# Patient Record
Sex: FEMALE | Race: BLACK OR AFRICAN AMERICAN | NOT HISPANIC OR LATINO | Employment: FULL TIME | URBAN - METROPOLITAN AREA
[De-identification: names, ages, dates, MRNs, and addresses within clinical notes are randomized per-mention and may not be internally consistent; named-entity substitution may affect disease eponyms.]

---

## 2017-08-26 ENCOUNTER — OFFICE VISIT (OUTPATIENT)
Dept: OCCUPATIONAL MEDICINE | Facility: CLINIC | Age: 52
End: 2017-08-26
Payer: OTHER MISCELLANEOUS

## 2017-08-26 VITALS
HEART RATE: 73 BPM | OXYGEN SATURATION: 97 % | WEIGHT: 162 LBS | RESPIRATION RATE: 19 BRPM | TEMPERATURE: 99 F | DIASTOLIC BLOOD PRESSURE: 86 MMHG | BODY MASS INDEX: 31.8 KG/M2 | HEIGHT: 60 IN | SYSTOLIC BLOOD PRESSURE: 124 MMHG

## 2017-08-26 DIAGNOSIS — S49.91XA RIGHT SHOULDER INJURY, INITIAL ENCOUNTER: Primary | ICD-10-CM

## 2017-08-26 PROCEDURE — 3008F BODY MASS INDEX DOCD: CPT | Mod: S$GLB,,, | Performed by: PHYSICIAN ASSISTANT

## 2017-08-26 PROCEDURE — 99203 OFFICE O/P NEW LOW 30 MIN: CPT | Mod: S$GLB,,, | Performed by: PHYSICIAN ASSISTANT

## 2017-08-26 RX ORDER — HYDROCODONE BITARTRATE AND ACETAMINOPHEN 7.5; 325 MG/1; MG/1
1 TABLET ORAL EVERY 6 HOURS PRN
Qty: 20 TABLET | Refills: 0 | Status: SHIPPED | OUTPATIENT
Start: 2017-08-26 | End: 2017-08-31

## 2017-08-26 RX ORDER — CYCLOBENZAPRINE HCL 10 MG
10 TABLET ORAL 3 TIMES DAILY PRN
COMMUNITY
End: 2019-03-20

## 2017-08-26 RX ORDER — MELOXICAM 15 MG/1
15 TABLET ORAL DAILY
COMMUNITY
End: 2019-03-20

## 2017-08-26 NOTE — PATIENT INSTRUCTIONS
- Please return here or go to the Emergency Department for any concerns or worsening of condition.   - Apply ice packs to the affected area(s) for 20-30 minutes several times daily for swelling and pain in addition to rest, elevation, and compression (ex. Extremities). Allow 1 hour between icing sessions to avoid damage to skin.   * Large, cheap, and reusable ice pack(s) can be easily made as follows. INSTRUCTIONS: Mix 1 cup of rubbing (isopropyl) alcohol to 3 cups water into a 1 gallon zip lock bag. Squeeze remaining air out of the bag and seal. Bag(s) to be kept flat in a freezer until cold and after each use.   - If you were prescribed a narcotic medication, do not drive or operate heavy equipment or machinery while taking these medications.  - Please follow up with your primary care provider (PCP) or discussed specialist(s) as needed.         Shoulder Pain with Uncertain Cause  Shoulder pain can have many causes. Pain often comes from the structures that surround the shoulder joint. These are the joint capsule, ligaments, tendons, muscles, and bursa. Pain can also come from cartilage in the joint. Cartilage can become worn out or injured. Its important to know whats causing your pain so the healthcare provider can use the correct treatment. But sometimes its difficult to find the exact cause of shoulder pain. You may need to see a specialist (orthopedist). You may also need special tests such as a CT scan or MRI. The provider may need to use special tools to look inside the joint (arthroscopy).  Shoulder pain can be treated with a sling or a device that keeps your shoulder from moving. You can take an anti-inflammatory medicine such as ibuprofen to ease pain. You may need to do special shoulder exercises. Follow up with a specialist if the pain is severe or doesnt go away after a few weeks.  Home care  Follow these tips when caring for yourself at home:  · If a sling was given to you, leave it in place for  the time advised by your healthcare provider. If you arent sure how long to wear it, ask for advice. If the sling becomes loose, adjust it so that your forearm is level with the ground. Your shoulder should feel well supported.  · Put an ice pack on the injured area for 20 minutes every 1 to 2 hours the first day. You can make your own ice pack by putting ice cubes in a plastic bag. Wrap the bag in a thin towel. Continue with ice packs 3 to 4 times a day for the next 2 days. Then use the pack as needed to ease pain and swelling.  · You may use acetaminophen or ibuprofen to control pain, unless another pain medicine was prescribed. If you have chronic liver or kidney disease, talk with your healthcare provider before using these medicines. Also talk with your provider if youve ever had a stomach ulcer or GI bleeding.  · Shoulder pain may seem worse at night, when there is less to distract you from the pain. If you sleep on your side, try to keep weight off your painful shoulder. Propping pillows behind you may stop you from rolling over onto that shoulder during sleep.   · Shoulder and elbow joints can become stiff if left in a sling for too long. You should start range of motion exercises about 7 to 10 days after the injury. Talk with your provider to find out what type of exercises to do and how soon to start.  · You can take the sling off to shower or bathe.  Follow-up care  Follow up with your healthcare provider if you dont start to get better in the next 5 days.  When to seek medical advice  Call your healthcare provider right away if any of these occur:  · Pain or swelling gets worse or continues for more than a few days  · Your hand or fingers become cold, blue, numb, or tingly  · Large amount of bruising on your shoulder or upper arm  · Difficulty moving your hand or fingers  · Weakness in your hand or fingers  · Your shoulder becomes stiff  · It feels like your shoulder is popping out  · You are less  able to do your daily activities  Date Last Reviewed: 10/1/2016  © 7776-8649 [employer]. [address] [place] [address], [place] [address]. All rights reserved. This information is not intended as a substitute for professional medical care. Always follow your healthcare professional's instructions.

## 2017-08-26 NOTE — PROGRESS NOTES
Subjective:       Patient ID: Sarah Mohamud is a 52 y.o. female.    Chief Complaint: Shoulder Injury    Pt reports that she was injured at work on 7/18/2017. Pt reports injury to the right shoulder. Pt reports that's he was pulling on the door when there was resistance from an inmate. Pt reports that she has been taking meloxicam and flexeril to help with muscle spasms but she has not experienced any relief.      Shoulder Injury    The incident occurred at work. The right shoulder is affected. The incident occurred more than 1 week ago. The quality of the pain is described as aching and stabbing. The pain radiates to the right arm. The pain is at a severity of 7/10. The pain is severe. Associated symptoms include muscle weakness, numbness and tingling. Pertinent negatives include no chest pain. The symptoms are aggravated by movement, overhead lifting and palpation. She has tried ice, heat and NSAIDs for the symptoms. The treatment provided no relief.     Review of Systems   Cardiovascular: Negative for chest pain.   Musculoskeletal: Positive for joint swelling (Joint pain). Negative for back pain, gait problem, neck pain and neck stiffness.   Neurological: Positive for tingling and numbness.       Objective:      Physical Exam   Constitutional: She is oriented to person, place, and time. She appears well-developed and well-nourished.  Non-toxic appearance. She does not have a sickly appearance. She does not appear ill. She appears distressed.   HENT:   Head: Normocephalic and atraumatic. Head is without abrasion, without contusion and without laceration.   Right Ear: External ear normal.   Left Ear: External ear normal.   Nose: Nose normal.   Mouth/Throat: Oropharynx is clear and moist.   Eyes: Conjunctivae, EOM and lids are normal. Pupils are equal, round, and reactive to light.   Neck: Trachea normal, full passive range of motion without pain and phonation normal. Neck supple.   Cardiovascular: Normal rate,  regular rhythm and normal heart sounds.    Pulmonary/Chest: Effort normal and breath sounds normal. No stridor. No respiratory distress.   Musculoskeletal:        Right shoulder: She exhibits decreased range of motion (in all directions), tenderness (posterior greater than anterior), swelling, pain (with movement in all directions) and decreased strength. She exhibits no bony tenderness, no crepitus, no deformity, no laceration and normal pulse.        Right elbow: Normal.       Right wrist: Normal.        Cervical back: Normal.        Thoracic back: Normal.        Right hand: Normal.   Neurological: She is alert and oriented to person, place, and time.   Skin: Skin is warm, dry and intact. Capillary refill takes less than 2 seconds. No abrasion, no bruising, no burn, no ecchymosis, no laceration, no lesion and no rash noted. No erythema.   Psychiatric: She has a normal mood and affect. Her speech is normal and behavior is normal. Judgment and thought content normal. Cognition and memory are normal.   Nursing note and vitals reviewed.      /86   Pulse 73   Temp 98.9 °F (37.2 °C)   Resp 19   Ht 5' (1.524 m)   Wt 73.5 kg (162 lb)   SpO2 97%   BMI 31.64 kg/m²      Assessment:       1. Right shoulder injury, initial encounter        Plan:       Right shoulder injury, initial encounter  -     ORTHOPEDIC BRACING FOR HOME USE - UPPER EXTREMITY  -     hydrocodone-acetaminophen 7.5-325mg (NORCO) 7.5-325 mg per tablet; Take 1 tablet by mouth every 6 (six) hours as needed for Pain.  Dispense: 20 tablet; Refill: 0        - Not fit for duty  - Follow up AS[facility] with [facility] for further evaluation and management  - Likely a Rotator Cuff injury     - Please return here or go to the Emergency Department for any concerns or worsening of condition.   - Apply ice packs to the affected area(s) for 20-30 minutes several times daily for swelling and pain in addition to rest, elevation, and compression (ex. Extremities). Allow 1  hour between icing sessions to avoid damage to skin.   * Large, cheap, and reusable ice pack(s) can be easily made as follows. INSTRUCTIONS: Mix 1 cup of rubbing (isopropyl) alcohol to 3 cups water into a 1 gallon zip lock bag. Squeeze remaining air out of the bag and seal. Bag(s) to be kept flat in a freezer until cold and after each use.   - If you were prescribed a narcotic medication, do not drive or operate heavy equipment or machinery while taking these medications.

## 2017-08-28 ENCOUNTER — OFFICE VISIT (OUTPATIENT)
Dept: URGENT CARE | Facility: CLINIC | Age: 52
End: 2017-08-28
Payer: OTHER MISCELLANEOUS

## 2017-08-28 VITALS
SYSTOLIC BLOOD PRESSURE: 140 MMHG | BODY MASS INDEX: 28.7 KG/M2 | DIASTOLIC BLOOD PRESSURE: 92 MMHG | WEIGHT: 162 LBS | HEART RATE: 67 BPM | HEIGHT: 63 IN

## 2017-08-28 DIAGNOSIS — S43.401A SPRAIN OF RIGHT SHOULDER, UNSPECIFIED SHOULDER SPRAIN TYPE, INITIAL ENCOUNTER: Primary | ICD-10-CM

## 2017-08-28 PROCEDURE — 99203 OFFICE O/P NEW LOW 30 MIN: CPT | Mod: S$GLB,,, | Performed by: PREVENTIVE MEDICINE

## 2017-08-28 PROCEDURE — 3008F BODY MASS INDEX DOCD: CPT | Mod: S$GLB,,, | Performed by: PREVENTIVE MEDICINE

## 2017-08-28 RX ORDER — MELOXICAM 7.5 MG/1
TABLET ORAL
COMMUNITY
Start: 2017-08-11 | End: 2019-03-20

## 2017-08-28 NOTE — PROGRESS NOTES
Subjective:       Patient ID: Sarah Mohamud is a 52 y.o. female.    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 Pt c/o right shoulder pain. Was originally seen at the hospital in [facility] after the injury but states the pain has not gotten better.Seen in Urgent care on 08/26/17 and sent to [facility]. Has been taking Mobic and Flexeril for her symptoms with mild relief. Has not filled the Norco yet.       Shoulder Pain    The pain is present in the right shoulder. The current episode started more than 1 month ago. There has been a history of trauma. The problem occurs constantly. The problem has been unchanged. The quality of the pain is described as sharp and aching. The pain is at a severity of 8/10. The pain is moderate. Associated symptoms include a limited range of motion, numbness (with tingling, intermittent to the right thumb) and stiffness. Pertinent negatives include no fever, headaches or itching. The symptoms are aggravated by activity. She has tried NSAIDS (flexeril) for the symptoms. The treatment provided mild relief. There is no history of gout.     Review of Systems   Constitution: Negative for chills, fever and weakness.   HENT: Negative for congestion, ear pain, headaches and nosebleeds.    Eyes: Negative for blurred vision and pain.   Cardiovascular: Negative for chest pain and palpitations.   Respiratory: Negative for cough, shortness of breath and wheezing.    Skin: Negative for dry skin, itching and rash.   Musculoskeletal: Positive for joint pain and stiffness. Negative for arthritis, back pain, gout, joint swelling, muscle weakness and neck pain.   Gastrointestinal: Negative for abdominal pain, constipation, diarrhea, nausea and vomiting.   Genitourinary: Negative for dysuria, frequency and hematuria.   Neurological:  Positive for numbness (with tingling, intermittent to the right thumb). Negative for dizziness and seizures.   Allergic/Immunologic: Negative for hives.   All other systems reviewed and are negative.      Objective:      Physical Exam   Constitutional: She is oriented to person, place, and time. She appears well-developed and well-nourished.   HENT:   Head: Normocephalic and atraumatic.   Neck:       Cardiovascular: Normal rate.    Pulmonary/Chest: Effort normal.   Musculoskeletal: She exhibits tenderness.        Right shoulder: She exhibits decreased range of motion, tenderness, bony tenderness, pain and spasm. She exhibits no swelling and no crepitus.        Arms:  Neurological: She is alert and oriented to person, place, and time. She has normal reflexes.   Skin: Skin is warm and dry. Capillary refill takes less than 2 seconds.   Psychiatric: She has a normal mood and affect.       Had detailed discussion with the pt on starting Pt, home exercises, proper use of medications for her pain. Pt advised not to wear the shoulder sling anymore.   Assessment:       1. Sprain of right shoulder, unspecified shoulder sprain type, initial encounter        Plan:            Patient Instructions: Daily home exercises/warm soaks, Apply ice 24-48 hours then apply heat/warm soaks, PT to be scheduled once authorized (continue Mobic and Flexeril as directed)   Restrictions: No lifting/pushing/pulling more than 10 lbs, No above the shoulder/overhead work

## 2017-08-28 NOTE — LETTER
[facility]  [address], [address]  [place] [address]  Phone: [phone]  Fax: [phone]    Pt Name: Sarah Mohamud  Injury Date: 7/14/2017   Employee ID:  Date of First Treatment: 08/28/2017   Company: [employer]            Appointment Time: 12:45 PM Arrived:  1:00 PM CDT   Physician: Eran Silva MD Check out: 3:59 PM           Office Treatment: Sarah was seen today for shoulder pain.    Diagnoses and all orders for this visit:    Sprain of right shoulder, unspecified shoulder sprain type, initial encounter  -     X-Ray Shoulder 1 View Right; Future  -     X-Ray Scapula Right; Future       Patient Instructions: Daily home exercises/warm soaks, Apply ice 24-48 hours then apply heat/warm soaks, PT to be scheduled once authorized (continue Mobic and Flexeril as directed)    WORK STATUS: LIGHT DUTY   No lifting/pushing/pulling more than 10 lbs,   No above the shoulder/overhead work       Return Appointment: 9/1/2017@1:30pm

## 2019-03-20 ENCOUNTER — HOSPITAL ENCOUNTER (INPATIENT)
Facility: HOSPITAL | Age: 54
LOS: 4 days | Discharge: HOME | DRG: 638 | End: 2019-03-24
Attending: EMERGENCY MEDICINE | Admitting: HOSPITALIST
Payer: COMMERCIAL

## 2019-03-20 DIAGNOSIS — E11.10 DIABETIC KETOACIDOSIS: ICD-10-CM

## 2019-03-20 DIAGNOSIS — E86.0 DEHYDRATION: ICD-10-CM

## 2019-03-20 DIAGNOSIS — E87.8 HYPERCHLOREMIA: ICD-10-CM

## 2019-03-20 DIAGNOSIS — E87.0 HYPERNATREMIA: Primary | ICD-10-CM

## 2019-03-20 PROBLEM — E87.20 METABOLIC ACIDOSIS: Status: ACTIVE | Noted: 2019-03-20

## 2019-03-20 PROBLEM — E87.5 HYPERKALEMIA: Status: ACTIVE | Noted: 2019-03-20

## 2019-03-20 PROBLEM — N17.9 ARF (ACUTE RENAL FAILURE): Status: ACTIVE | Noted: 2019-03-20

## 2019-03-20 PROBLEM — E11.9 DIABETES MELLITUS, TYPE 2: Chronic | Status: ACTIVE | Noted: 2019-03-20

## 2019-03-20 LAB
ALBUMIN SERPL BCP-MCNC: 3.9 G/DL
ALLENS TEST: ABNORMAL
ALP SERPL-CCNC: 79 U/L
ALT SERPL W/O P-5'-P-CCNC: 17 U/L
ANION GAP SERPL CALC-SCNC: 25 MMOL/L
ANION GAP SERPL CALC-SCNC: 33 MMOL/L
ANION GAP SERPL CALC-SCNC: ABNORMAL MMOL/L
AST SERPL-CCNC: 16 U/L
BACTERIA #/AREA URNS HPF: ABNORMAL /HPF
BASOPHILS # BLD AUTO: 0.01 K/UL
BASOPHILS NFR BLD: 0.1 %
BILIRUB SERPL-MCNC: 0.5 MG/DL
BILIRUB UR QL STRIP: NEGATIVE
BILIRUB UR QL STRIP: NEGATIVE
BUN SERPL-MCNC: 50 MG/DL
BUN SERPL-MCNC: 57 MG/DL
BUN SERPL-MCNC: 78 MG/DL (ref 6–30)
CALCIUM SERPL-MCNC: 9.1 MG/DL
CALCIUM SERPL-MCNC: 9.9 MG/DL
CHLORIDE SERPL-SCNC: 117 MMOL/L
CHLORIDE SERPL-SCNC: 127 MMOL/L (ref 95–110)
CHLORIDE SERPL-SCNC: 129 MMOL/L
CLARITY UR: CLEAR
CLARITY UR: CLEAR
CO2 SERPL-SCNC: 11 MMOL/L
CO2 SERPL-SCNC: 13 MMOL/L
COLOR UR: YELLOW
COLOR UR: YELLOW
CREAT SERPL-MCNC: 1.5 MG/DL (ref 0.5–1.4)
CREAT SERPL-MCNC: 1.9 MG/DL
CREAT SERPL-MCNC: 2.5 MG/DL
DELSYS: ABNORMAL
DIFFERENTIAL METHOD: ABNORMAL
EOSINOPHIL # BLD AUTO: 0 K/UL
EOSINOPHIL NFR BLD: 0 %
ERYTHROCYTE [DISTWIDTH] IN BLOOD BY AUTOMATED COUNT: 14 %
EST. GFR  (AFRICAN AMERICAN): 25 ML/MIN/1.73 M^2
EST. GFR  (AFRICAN AMERICAN): 34 ML/MIN/1.73 M^2
EST. GFR  (NON AFRICAN AMERICAN): 21 ML/MIN/1.73 M^2
EST. GFR  (NON AFRICAN AMERICAN): 30 ML/MIN/1.73 M^2
GLUCOSE SERPL-MCNC: 432 MG/DL
GLUCOSE SERPL-MCNC: 620 MG/DL (ref 70–110)
GLUCOSE SERPL-MCNC: 677 MG/DL
GLUCOSE UR QL STRIP: ABNORMAL
GLUCOSE UR QL STRIP: ABNORMAL
HCO3 UR-SCNC: 14.1 MMOL/L (ref 24–28)
HCT VFR BLD AUTO: 52.2 %
HCT VFR BLD CALC: 47 %PCV (ref 36–54)
HGB BLD-MCNC: 16.6 G/DL
HGB UR QL STRIP: ABNORMAL
HGB UR QL STRIP: ABNORMAL
KETONES UR QL STRIP: ABNORMAL
KETONES UR QL STRIP: ABNORMAL
LEUKOCYTE ESTERASE UR QL STRIP: ABNORMAL
LEUKOCYTE ESTERASE UR QL STRIP: ABNORMAL
LYMPHOCYTES # BLD AUTO: 0.9 K/UL
LYMPHOCYTES NFR BLD: 9.3 %
MAGNESIUM SERPL-MCNC: 3.4 MG/DL
MCH RBC QN AUTO: 28.5 PG
MCHC RBC AUTO-ENTMCNC: 31.8 G/DL
MCV RBC AUTO: 90 FL
MICROSCOPIC COMMENT: ABNORMAL
MODE: ABNORMAL
MONOCYTES # BLD AUTO: 0.6 K/UL
MONOCYTES NFR BLD: 6.3 %
NEUTROPHILS # BLD AUTO: 7.9 K/UL
NEUTROPHILS NFR BLD: 84.3 %
NITRITE UR QL STRIP: NEGATIVE
NITRITE UR QL STRIP: NEGATIVE
PCO2 BLDA: 38.3 MMHG (ref 35–45)
PH SMN: 7.17 [PH] (ref 7.35–7.45)
PH UR STRIP: 5 [PH] (ref 5–8)
PH UR STRIP: 5 [PH] (ref 5–8)
PLATELET # BLD AUTO: 185 K/UL
PMV BLD AUTO: 12.8 FL
PO2 BLDA: 29 MMHG (ref 40–60)
POC BE: -14 MMOL/L
POC IONIZED CALCIUM: 1.02 MMOL/L (ref 1.06–1.42)
POC SATURATED O2: 40 % (ref 95–100)
POC TCO2 (MEASURED): 17 MMOL/L (ref 23–29)
POC TCO2: 15 MMOL/L (ref 24–29)
POCT GLUCOSE: 272 MG/DL (ref 70–110)
POCT GLUCOSE: 279 MG/DL (ref 70–110)
POCT GLUCOSE: 306 MG/DL (ref 70–110)
POCT GLUCOSE: 367 MG/DL (ref 70–110)
POCT GLUCOSE: 427 MG/DL (ref 70–110)
POCT GLUCOSE: 491 MG/DL (ref 70–110)
POTASSIUM BLD-SCNC: >9 MMOL/L (ref 3.5–5.1)
POTASSIUM SERPL-SCNC: 4.6 MMOL/L
POTASSIUM SERPL-SCNC: 5.6 MMOL/L
PROT SERPL-MCNC: 8.6 G/DL
PROT UR QL STRIP: NEGATIVE
PROT UR QL STRIP: NEGATIVE
RBC # BLD AUTO: 5.83 M/UL
RBC #/AREA URNS HPF: 5 /HPF (ref 0–4)
SAMPLE: ABNORMAL
SAMPLE: ABNORMAL
SITE: ABNORMAL
SODIUM BLD-SCNC: 155 MMOL/L (ref 136–145)
SODIUM SERPL-SCNC: 161 MMOL/L
SODIUM SERPL-SCNC: 167 MMOL/L
SP GR UR STRIP: 1.01 (ref 1–1.03)
SP GR UR STRIP: 1.01 (ref 1–1.03)
SP02: 96
SQUAMOUS #/AREA URNS HPF: 5 /HPF
TRICHOMONAS UR QL MICRO: ABNORMAL
URN SPEC COLLECT METH UR: ABNORMAL
URN SPEC COLLECT METH UR: ABNORMAL
UROBILINOGEN UR STRIP-ACNC: NEGATIVE EU/DL
UROBILINOGEN UR STRIP-ACNC: NEGATIVE EU/DL
WBC # BLD AUTO: 9.36 K/UL
WBC #/AREA URNS HPF: 40 /HPF (ref 0–5)
YEAST URNS QL MICRO: ABNORMAL

## 2019-03-20 PROCEDURE — 99900035 HC TECH TIME PER 15 MIN (STAT)

## 2019-03-20 PROCEDURE — 82803 BLOOD GASES ANY COMBINATION: CPT

## 2019-03-20 PROCEDURE — 36415 COLL VENOUS BLD VENIPUNCTURE: CPT

## 2019-03-20 PROCEDURE — 96361 HYDRATE IV INFUSION ADD-ON: CPT

## 2019-03-20 PROCEDURE — 93010 ELECTROCARDIOGRAM REPORT: CPT | Mod: ,,, | Performed by: INTERNAL MEDICINE

## 2019-03-20 PROCEDURE — 25000003 PHARM REV CODE 250: Performed by: HOSPITALIST

## 2019-03-20 PROCEDURE — 25000003 PHARM REV CODE 250: Performed by: EMERGENCY MEDICINE

## 2019-03-20 PROCEDURE — 83735 ASSAY OF MAGNESIUM: CPT

## 2019-03-20 PROCEDURE — 93010 EKG 12-LEAD: ICD-10-PCS | Mod: ,,, | Performed by: INTERNAL MEDICINE

## 2019-03-20 PROCEDURE — 82962 GLUCOSE BLOOD TEST: CPT

## 2019-03-20 PROCEDURE — 81000 URINALYSIS NONAUTO W/SCOPE: CPT

## 2019-03-20 PROCEDURE — 85025 COMPLETE CBC W/AUTO DIFF WBC: CPT

## 2019-03-20 PROCEDURE — 99285 EMERGENCY DEPT VISIT HI MDM: CPT

## 2019-03-20 PROCEDURE — 63600175 PHARM REV CODE 636 W HCPCS: Performed by: HOSPITALIST

## 2019-03-20 PROCEDURE — 87086 URINE CULTURE/COLONY COUNT: CPT

## 2019-03-20 PROCEDURE — 80048 BASIC METABOLIC PNL TOTAL CA: CPT

## 2019-03-20 PROCEDURE — 83036 HEMOGLOBIN GLYCOSYLATED A1C: CPT

## 2019-03-20 PROCEDURE — 80053 COMPREHEN METABOLIC PANEL: CPT

## 2019-03-20 PROCEDURE — 20000000 HC ICU ROOM

## 2019-03-20 PROCEDURE — 96374 THER/PROPH/DIAG INJ IV PUSH: CPT

## 2019-03-20 PROCEDURE — 96375 TX/PRO/DX INJ NEW DRUG ADDON: CPT

## 2019-03-20 PROCEDURE — 25000003 PHARM REV CODE 250: Performed by: INTERNAL MEDICINE

## 2019-03-20 PROCEDURE — 63600175 PHARM REV CODE 636 W HCPCS: Performed by: EMERGENCY MEDICINE

## 2019-03-20 PROCEDURE — 93005 ELECTROCARDIOGRAM TRACING: CPT

## 2019-03-20 RX ORDER — PROMETHAZINE HYDROCHLORIDE 25 MG/1
25 TABLET ORAL EVERY 6 HOURS PRN
Status: DISCONTINUED | OUTPATIENT
Start: 2019-03-20 | End: 2019-03-20

## 2019-03-20 RX ORDER — SODIUM CHLORIDE 450 MG/100ML
INJECTION, SOLUTION INTRAVENOUS CONTINUOUS
Status: DISCONTINUED | OUTPATIENT
Start: 2019-03-20 | End: 2019-03-21

## 2019-03-20 RX ORDER — METFORMIN HYDROCHLORIDE 500 MG/1
500 TABLET ORAL 2 TIMES DAILY WITH MEALS
COMMUNITY
End: 2019-03-20 | Stop reason: ALTCHOICE

## 2019-03-20 RX ORDER — ONDANSETRON 2 MG/ML
8 INJECTION INTRAMUSCULAR; INTRAVENOUS EVERY 6 HOURS PRN
Status: DISCONTINUED | OUTPATIENT
Start: 2019-03-20 | End: 2019-03-24 | Stop reason: HOSPADM

## 2019-03-20 RX ORDER — DEXTROSE MONOHYDRATE 100 MG/ML
1000 INJECTION, SOLUTION INTRAVENOUS
Status: DISCONTINUED | OUTPATIENT
Start: 2019-03-20 | End: 2019-03-20

## 2019-03-20 RX ORDER — POLYETHYLENE GLYCOL 3350 17 G/17G
17 POWDER, FOR SOLUTION ORAL DAILY
Status: DISCONTINUED | OUTPATIENT
Start: 2019-03-21 | End: 2019-03-20

## 2019-03-20 RX ORDER — SODIUM CHLORIDE 9 MG/ML
INJECTION, SOLUTION INTRAVENOUS CONTINUOUS
Status: DISCONTINUED | OUTPATIENT
Start: 2019-03-20 | End: 2019-03-21

## 2019-03-20 RX ORDER — SODIUM CHLORIDE 0.9 % (FLUSH) 0.9 %
5 SYRINGE (ML) INJECTION
Status: DISCONTINUED | OUTPATIENT
Start: 2019-03-20 | End: 2019-03-24 | Stop reason: HOSPADM

## 2019-03-20 RX ORDER — DEXTROSE MONOHYDRATE 100 MG/ML
1000 INJECTION, SOLUTION INTRAVENOUS
Status: DISCONTINUED | OUTPATIENT
Start: 2019-03-20 | End: 2019-03-21

## 2019-03-20 RX ORDER — ACETAMINOPHEN 325 MG/1
650 TABLET ORAL EVERY 4 HOURS PRN
Status: DISCONTINUED | OUTPATIENT
Start: 2019-03-20 | End: 2019-03-24 | Stop reason: HOSPADM

## 2019-03-20 RX ORDER — POLYETHYLENE GLYCOL 3350 17 G/17G
17 POWDER, FOR SOLUTION ORAL 2 TIMES DAILY PRN
Status: DISCONTINUED | OUTPATIENT
Start: 2019-03-20 | End: 2019-03-24 | Stop reason: HOSPADM

## 2019-03-20 RX ORDER — ONDANSETRON HYDROCHLORIDE 8 MG/1
8 TABLET, FILM COATED ORAL
COMMUNITY

## 2019-03-20 RX ORDER — HYDROCODONE BITARTRATE AND ACETAMINOPHEN 5; 325 MG/1; MG/1
1 TABLET ORAL EVERY 4 HOURS PRN
Status: DISCONTINUED | OUTPATIENT
Start: 2019-03-20 | End: 2019-03-20

## 2019-03-20 RX ORDER — HYDROCODONE BITARTRATE AND ACETAMINOPHEN 5; 325 MG/1; MG/1
1 TABLET ORAL EVERY 4 HOURS PRN
Status: DISCONTINUED | OUTPATIENT
Start: 2019-03-20 | End: 2019-03-23

## 2019-03-20 RX ADMIN — SODIUM CHLORIDE 4 UNITS/HR: 9 INJECTION, SOLUTION INTRAVENOUS at 06:03

## 2019-03-20 RX ADMIN — SODIUM CHLORIDE: 0.45 INJECTION, SOLUTION INTRAVENOUS at 09:03

## 2019-03-20 RX ADMIN — SODIUM CHLORIDE 4 UNITS/HR: 9 INJECTION, SOLUTION INTRAVENOUS at 05:03

## 2019-03-20 RX ADMIN — SODIUM CHLORIDE 250 ML: 0.9 INJECTION, SOLUTION INTRAVENOUS at 06:03

## 2019-03-20 RX ADMIN — INSULIN HUMAN 10 UNITS: 100 INJECTION, SOLUTION PARENTERAL at 04:03

## 2019-03-20 RX ADMIN — SODIUM CHLORIDE: 0.9 INJECTION, SOLUTION INTRAVENOUS at 06:03

## 2019-03-20 RX ADMIN — SODIUM CHLORIDE 2000 ML: 0.9 INJECTION, SOLUTION INTRAVENOUS at 04:03

## 2019-03-20 NOTE — HPI
55 y/o female presents complaining of generalized weakness.  She has recently been feeling bad for the last month.  Patient has been complaining of polydipsia, polyuria and generalized fatigue.  She was evaluated at an Urgent Care 2 days ago and diagnosed with DM.  No previous hx of DM.  Patient was started on Metformin.  Her symptoms have persisted over past 2 days.  Denies any fever or chills.  She had not seen a doctor in many years until being evaluated at Urgent Care.  She presented to ER where she was again noted to be severely hyperglycemic.  Labs also consistent with DKA.  No other complaints.

## 2019-03-20 NOTE — PROGRESS NOTES
Pt admitted to ICU for DKA. Pt with new onset of diabetes. Social work and dietician consulted. Insulin gtt at 4 units/hr and IVF's at 125 mL/hr. q6 BMP's. Remains free from fall, injury, or breakdown throughout shift.

## 2019-03-20 NOTE — ASSESSMENT & PLAN NOTE
Newly diagnosed DM presents with hyperglycemia and high anion gap metabolic acidosis, consistent with DKA.  Placed on insulin drip and admitted to ICU.  Start aggressive IVF hydration.  Monitor BMP every 4 hours.

## 2019-03-20 NOTE — ASSESSMENT & PLAN NOTE
Probably pre renal secondary to dehydration.  Should improve with IVF's.  Avoid nephrotoxic medications.

## 2019-03-20 NOTE — ED TRIAGE NOTES
Pt arrived via EMS from home. CC of hyperglycemia. Pt reports that she was dx with DMtype2 on Monday, was sent home with metformin and told to follow up with PCP. Pt reports she does not have a PCP so has been unable to follow up. Pt reports today with increased weakness and dehydrated. Pt denies N/V/F/D/SOB

## 2019-03-20 NOTE — H&P
[facility] Hospital Medicine  History & Physical    Patient Name: Sarah Mohamud  MRN: [id number]  Admission Date: 3/20/2019  Attending Physician: Jamin Solares MD   Primary Care Provider: ADRIANO Beaulieu         Patient information was obtained from patient and ER records.     Subjective:     Principal Problem:Diabetic ketoacidosis    Chief Complaint:   Chief Complaint   Patient presents with    Hyperglycemia     EMS reports just dx with diabetes on Monday, blood glucose >600, c/o weakness         HPI: 53 y/o female presents complaining of generalized weakness.  She has recently been feeling bad for the last month.  Patient has been complaining of polydipsia, polyuria and generalized fatigue.  She was evaluated at an Urgent Care 2 days ago and diagnosed with DM.  No previous hx of DM.  Patient was started on Metformin.  Her symptoms have persisted over past 2 days.  Denies any fever or chills.  She had not seen a doctor in many years until being evaluated at Urgent Care.  She presented to ER where she was again noted to be severely hyperglycemic.  Labs also consistent with DKA.  No other complaints.    Past Medical History:   Diagnosis Date    Acid reflux        Past Surgical History:   Procedure Laterality Date    SHOULDER OPEN ROTATOR CUFF REPAIR Right 2018       Review of patient's allergies indicates:  No Known Allergies    No current facility-administered medications on file prior to encounter.      Current Outpatient Medications on File Prior to Encounter   Medication Sig    ondansetron (ZOFRAN) 8 MG tablet Take 8 mg by mouth every 8 (eight) hours.    [DISCONTINUED] metFORMIN (GLUCOPHAGE) 500 MG tablet Take 500 mg by mouth 2 (two) times daily with meals.    [DISCONTINUED] cyclobenzaprine (FLEXERIL) 10 MG tablet Take 10 mg by mouth 3 (three) times daily as needed for Muscle spasms.    [DISCONTINUED] meloxicam (MOBIC) 15 MG tablet Take 15 mg by mouth once daily.     [DISCONTINUED] meloxicam (MOBIC) 7.5 MG tablet      Family History     Problem Relation (Age of Onset)    Depression Maternal Grandmother    Diabetes Mother        Tobacco Use    Smoking status: Never Smoker    Smokeless tobacco: Never Used   Substance and Sexual Activity    Alcohol use: No    Drug use: No    Sexual activity: Not on file     Review of Systems   Constitutional: Positive for fatigue. Negative for fever.   HENT: Negative for ear discharge and ear pain.    Eyes: Negative for pain and itching.   Respiratory: Negative for cough and shortness of breath.    Cardiovascular: Negative for chest pain and palpitations.   Gastrointestinal: Negative for abdominal pain and diarrhea.   Endocrine: Positive for polydipsia and polyuria.   Genitourinary: Negative for difficulty urinating and dysuria.   Musculoskeletal: Negative for neck pain and neck stiffness.   Skin: Negative for rash and wound.   Neurological: Negative for seizures and syncope.   Psychiatric/Behavioral: Negative for agitation and hallucinations.     Objective:     Vital Signs (Most Recent):  Temp: 97.5 °F (36.4 °C) (03/20/19 1800)  Pulse: 91 (03/20/19 1800)  Resp: (!) 23 (03/20/19 1800)  BP: 127/88 (03/20/19 1800)  SpO2: 98 % (03/20/19 1800) Vital Signs (24h Range):  Temp:  [97.5 °F (36.4 °C)-98.7 °F (37.1 °C)] 97.5 °F (36.4 °C)  Pulse:  [] 91  Resp:  [12-26] 23  SpO2:  [96 %-99 %] 98 %  BP: (123-142)/(61-88) 127/88     Weight: 63 kg (138 lb 14.2 oz)  Body mass index is 23.84 kg/m².    Physical Exam   Constitutional: She is oriented to person, place, and time. She appears well-developed. No distress.   Fatigued   HENT:   Head: Normocephalic and atraumatic.   Eyes: Conjunctivae are normal. Right eye exhibits no discharge. Left eye exhibits no discharge.   Neck: Neck supple. No tracheal deviation present.   Cardiovascular: Normal rate, regular rhythm and normal heart sounds.   Pulmonary/Chest: Effort normal and breath sounds normal.    Abdominal: Soft. Bowel sounds are normal.   Musculoskeletal: Normal range of motion. She exhibits no deformity.   Neurological: She is alert and oriented to person, place, and time.   Skin: Skin is warm and dry.           Significant Labs:   BMP:   Recent Labs   Lab 03/20/19  1556   *   *   K 5.6*   *   CO2 11*   BUN 57*   CREATININE 2.5*   CALCIUM 9.9   MG 3.4*     CBC:   Recent Labs   Lab 03/20/19  1556   WBC 9.36   HGB 16.6*   HCT 52.2*          Significant Imaging: I have reviewed all pertinent imaging results/findings within the past 24 hours.    Assessment/Plan:     * Diabetic ketoacidosis    Newly diagnosed DM presents with hyperglycemia and high anion gap metabolic acidosis, consistent with DKA.  Placed on insulin drip and admitted to ICU.  Start aggressive IVF hydration.  Monitor BMP every 4 hours.       Metabolic acidosis           Diabetes mellitus, type 2    Newly diagnosed.  Check HgA1c.  Uncontrolled with hyperglycemia.       ARF (acute renal failure)    Probably pre renal secondary to dehydration.  Should improve with IVF's.  Avoid nephrotoxic medications.       Hypernatremia    Secondary to fluid depletion.  Continue IVF's.       Hyperkalemia    Will improve with insulin treatment.  Continue to monitor.         VTE Risk Mitigation (From admission, onward)        Ordered     Place BUNNY hose  Until discontinued      03/20/19 1755     IP VTE LOW RISK PATIENT  Once      03/20/19 1755     Place BUNNY hose  Until discontinued      03/20/19 1755        Critical care time spent on the evaluation and treatment of severe organ dysfunction, review of pertinent labs and imaging studies, discussions with consulting providers and discussions with patient/family: 50 minutes.     Jamin Solares MD  Department of Hospital Medicine   [facility]

## 2019-03-20 NOTE — ED PROVIDER NOTES
Encounter Date: 3/20/2019    SCRIBE #1 NOTE: I, Frandy Beverly, am scribing for, and in the presence of,  King Evans MD. I have scribed the following portions of the note - Other sections scribed: HPI and ROS.       History     Chief Complaint   Patient presents with    Hyperglycemia     EMS reports just dx with diabetes on Monday, blood glucose >600, c/o weakness      CC: Hyperglycemia    HPI: This 54 y.o F with DM type II and acid reflux presents to the ED via EMS c/o Hyperglycemia and generalized weakness. The pt was dx'ed with DM type II at an urgent care 3/18/19. She was told that her CBG was >500mg/dL and Rx'ed Metformin. She reports generalized weakness and dehydration x3 weeks. She also report chills and sore throat. She denies fever, diaphoresis, nausea, emesis, diarrhea, abdominal pain, dysuria, headache, eye pain, otalgia, chest pain and cough. She does not smoke cigarettes or consume EtOH. No prior tx.     PSHx: Rotator Cuff Surgery        The history is provided by the patient. No  was used.     Review of patient's allergies indicates:  No Known Allergies  Past Medical History:   Diagnosis Date    Acid reflux      Past Surgical History:   Procedure Laterality Date    SHOULDER OPEN ROTATOR CUFF REPAIR Right 2018     Family History   Problem Relation Age of Onset    Diabetes Mother     Depression Maternal Grandmother      Social History     Tobacco Use    Smoking status: Never Smoker    Smokeless tobacco: Never Used   Substance Use Topics    Alcohol use: No    Drug use: No     Review of Systems   Constitutional: Positive for chills. Negative for fever.   HENT: Positive for sore throat. Negative for congestion, ear pain and rhinorrhea.    Eyes: Negative for pain and visual disturbance.   Respiratory: Negative for cough and shortness of breath.    Cardiovascular: Negative for chest pain.   Gastrointestinal: Negative for abdominal pain, diarrhea, nausea and vomiting.    Genitourinary: Negative for dysuria.   Musculoskeletal: Negative for back pain and neck pain.   Skin: Negative for rash.   Neurological: Positive for weakness (generalized). Negative for headaches.       Physical Exam     Initial Vitals [03/20/19 1524]   BP Pulse Resp Temp SpO2   136/68 104 (!) 22 98.5 °F (36.9 °C) 98 %      MAP       --         Physical Exam  The patient was examined specifically for the following:   General:No significant distress, Good color, Warm and dry. Head and neck:Scalp atraumatic, Neck supple. Neurological:Appropriate conversation, Gross motor deficits. Eyes:Conjugate gaze, Clear corneas. ENT: No epistaxis. Cardiac: Regular rate and rhythm, Grossly normal heart tones. Pulmonary: Wheezing, Rales. Gastrointestinal: Abdominal tenderness, Abdominal distention. Musculoskeletal: Extremity deformity, Apparent pain with range of motion of the joints. Skin: Rash.   The findings on examination were normal except for the following:  Patient's heart rate is 104.  She looks weak.  The lungs are clear.  The mucous membranes are dry.  ED Course   Critical Care  Date/Time: 3/20/2019 4:49 PM  Performed by: King Evans MD  Authorized by: King Evans MD   Direct patient critical care time: 22 minutes  Additional history critical care time: 11 minutes  Ordering / reviewing critical care time: 11 minutes  Documentation critical care time: 11 minutes  Consulting other physicians critical care time: 11 minutes  Consult with family critical care time: 4 minutes  Total critical care time (exclusive of procedural time) : 70 minutes  Critical care time was exclusive of separately billable procedures and treating other patients.  Critical care was necessary to treat or prevent imminent or life-threatening deterioration of the following conditions: metabolic crisis.  Critical care was time spent personally by me on the following activities: discussions with primary provider, interpretation of cardiac  output measurements, examination of patient, ordering and performing treatments and interventions, ordering and review of radiographic studies, re-evaluation of patient's condition, development of treatment plan with patient or surrogate, review of old charts, obtaining history from patient or surrogate and evaluation of patient's response to treatment.        Labs Reviewed   COMPREHENSIVE METABOLIC PANEL - Abnormal; Notable for the following components:       Result Value    Sodium 161 (*)     Potassium 5.6 (*)     Chloride 117 (*)     CO2 11 (*)     Glucose 677 (*)     BUN, Bld 57 (*)     Creatinine 2.5 (*)     Total Protein 8.6 (*)     Anion Gap 33 (*)     eGFR if  25 (*)     eGFR if non  21 (*)     All other components within normal limits    Narrative:     Sodium and Glucose critical result(s) called and verbal readback   obtained from:Azul Hidalgo , 03/20/2019 16:42   CBC W/ AUTO DIFFERENTIAL - Abnormal; Notable for the following components:    RBC 5.83 (*)     Hemoglobin 16.6 (*)     Hematocrit 52.2 (*)     MCHC 31.8 (*)     Gran # (ANC) 7.9 (*)     Lymph # 0.9 (*)     Gran% 84.3 (*)     Lymph% 9.3 (*)     All other components within normal limits   MAGNESIUM - Abnormal; Notable for the following components:    Magnesium 3.4 (*)     All other components within normal limits   ISTAT PROCEDURE - Abnormal; Notable for the following components:    POC PH 7.175 (*)     POC PO2 29 (*)     POC HCO3 14.1 (*)     POC SATURATED O2 40 (*)     POC TCO2 15 (*)     All other components within normal limits   URINALYSIS, REFLEX TO URINE CULTURE   POCT GLUCOSE MONITORING CONTINUOUS   ISTAT CHEM8     EKG Readings: (Independently Interpreted)   This patient is in a normal sinus rhythm with a heart rate of 99.  The p.r. and QRS intervals are normal.  There is poor R-wave progression across the precordium.  The patient has a normal axis.  There are low voltage QRS complexes.  There is a long QT  interval.  There is no definite evidence of acute myocardial infarction or malignant arrhythmia.       Imaging Results    None       Medical decision making:  Given the above, this patient presents to the emergency room with vomiting feeling bad feeling weak.  The patient was newly diagnosed with diabetes mellitus type 2.  She was started on glyburide and metformin.  She has been feeling bad since.  She arrives today with a blood glucose of 630.  Her chloride is 127.  Her sodium is 125.  She has a markedly elevated BUN and hematocrit of 47.  Her serum CO2 is 17.  Her potassium on the I Stat is 9.  This patient will require admission to the ICU.   Stat lab a chemistries reveal a potassium of 5.6 a sodium of 161 chloride of 117 a CO2 of 11 and anion gap a 33.  The BUN is 57 and the creatinine is 2.5.  The patient's glucose is 677.                Scribe Attestation:   Scribe #1: I performed the above scribed service and the documentation accurately describes the services I performed. I attest to the accuracy of the note.    Attending Attestation:           Physician Attestation for Scribe:  Physician Attestation Statement for Scribe #1: I, King Evans MD, reviewed documentation, as scribed by Frandy Beverly in my presence, and it is both accurate and complete.                    Clinical Impression:       ICD-10-CM ICD-9-CM   1. Hypernatremia E87.0 276.0   2. Diabetic ketoacidosis E13.10 250.10   3. Hyperchloremia E87.8 276.9   4. Dehydration E86.0 276.51                                King Evans MD  03/20/19 2084

## 2019-03-20 NOTE — SUBJECTIVE & OBJECTIVE
Past Medical History:   Diagnosis Date    Acid reflux        Past Surgical History:   Procedure Laterality Date    SHOULDER OPEN ROTATOR CUFF REPAIR Right 2018       Review of patient's allergies indicates:  No Known Allergies    No current facility-administered medications on file prior to encounter.      Current Outpatient Medications on File Prior to Encounter   Medication Sig    ondansetron (ZOFRAN) 8 MG tablet Take 8 mg by mouth every 8 (eight) hours.    [DISCONTINUED] metFORMIN (GLUCOPHAGE) 500 MG tablet Take 500 mg by mouth 2 (two) times daily with meals.    [DISCONTINUED] cyclobenzaprine (FLEXERIL) 10 MG tablet Take 10 mg by mouth 3 (three) times daily as needed for Muscle spasms.    [DISCONTINUED] meloxicam (MOBIC) 15 MG tablet Take 15 mg by mouth once daily.    [DISCONTINUED] meloxicam (MOBIC) 7.5 MG tablet      Family History     Problem Relation (Age of Onset)    Depression Maternal Grandmother    Diabetes Mother        Tobacco Use    Smoking status: Never Smoker    Smokeless tobacco: Never Used   Substance and Sexual Activity    Alcohol use: No    Drug use: No    Sexual activity: Not on file     Review of Systems   Constitutional: Positive for fatigue. Negative for fever.   HENT: Negative for ear discharge and ear pain.    Eyes: Negative for pain and itching.   Respiratory: Negative for cough and shortness of breath.    Cardiovascular: Negative for chest pain and palpitations.   Gastrointestinal: Negative for abdominal pain and diarrhea.   Endocrine: Positive for polydipsia and polyuria.   Genitourinary: Negative for difficulty urinating and dysuria.   Musculoskeletal: Negative for neck pain and neck stiffness.   Skin: Negative for rash and wound.   Neurological: Negative for seizures and syncope.   Psychiatric/Behavioral: Negative for agitation and hallucinations.     Objective:     Vital Signs (Most Recent):  Temp: 97.5 °F (36.4 °C) (03/20/19 1800)  Pulse: 91 (03/20/19 1800)  Resp: (!) 23  (03/20/19 1800)  BP: 127/88 (03/20/19 1800)  SpO2: 98 % (03/20/19 1800) Vital Signs (24h Range):  Temp:  [97.5 °F (36.4 °C)-98.7 °F (37.1 °C)] 97.5 °F (36.4 °C)  Pulse:  [] 91  Resp:  [12-26] 23  SpO2:  [96 %-99 %] 98 %  BP: (123-142)/(61-88) 127/88     Weight: 63 kg (138 lb 14.2 oz)  Body mass index is 23.84 kg/m².    Physical Exam   Constitutional: She is oriented to person, place, and time. She appears well-developed. No distress.   Fatigued   HENT:   Head: Normocephalic and atraumatic.   Eyes: Conjunctivae are normal. Right eye exhibits no discharge. Left eye exhibits no discharge.   Neck: Neck supple. No tracheal deviation present.   Cardiovascular: Normal rate, regular rhythm and normal heart sounds.   Pulmonary/Chest: Effort normal and breath sounds normal.   Abdominal: Soft. Bowel sounds are normal.   Musculoskeletal: Normal range of motion. She exhibits no deformity.   Neurological: She is alert and oriented to person, place, and time.   Skin: Skin is warm and dry.           Significant Labs:   BMP:   Recent Labs   Lab 03/20/19  1556   *   *   K 5.6*   *   CO2 11*   BUN 57*   CREATININE 2.5*   CALCIUM 9.9   MG 3.4*     CBC:   Recent Labs   Lab 03/20/19  1556   WBC 9.36   HGB 16.6*   HCT 52.2*          Significant Imaging: I have reviewed all pertinent imaging results/findings within the past 24 hours.

## 2019-03-21 LAB
ANION GAP SERPL CALC-SCNC: 12 MMOL/L
ANION GAP SERPL CALC-SCNC: ABNORMAL MMOL/L
BASOPHILS # BLD AUTO: 0.01 K/UL
BASOPHILS NFR BLD: 0.1 %
BUN SERPL-MCNC: 29 MG/DL
BUN SERPL-MCNC: 36 MG/DL
BUN SERPL-MCNC: 36 MG/DL
BUN SERPL-MCNC: 42 MG/DL
CALCIUM SERPL-MCNC: 8.5 MG/DL
CALCIUM SERPL-MCNC: 8.6 MG/DL
CHLORIDE SERPL-SCNC: 122 MMOL/L
CHLORIDE SERPL-SCNC: >130 MMOL/L
CO2 SERPL-SCNC: 21 MMOL/L
CO2 SERPL-SCNC: 23 MMOL/L
CO2 SERPL-SCNC: 27 MMOL/L
CO2 SERPL-SCNC: 27 MMOL/L
CREAT SERPL-MCNC: 1.4 MG/DL
CREAT SERPL-MCNC: 1.6 MG/DL
DIFFERENTIAL METHOD: ABNORMAL
EOSINOPHIL # BLD AUTO: 0 K/UL
EOSINOPHIL NFR BLD: 0 %
ERYTHROCYTE [DISTWIDTH] IN BLOOD BY AUTOMATED COUNT: 13.9 %
EST. GFR  (AFRICAN AMERICAN): 42 ML/MIN/1.73 M^2
EST. GFR  (AFRICAN AMERICAN): 50 ML/MIN/1.73 M^2
EST. GFR  (NON AFRICAN AMERICAN): 37 ML/MIN/1.73 M^2
EST. GFR  (NON AFRICAN AMERICAN): 43 ML/MIN/1.73 M^2
ESTIMATED AVG GLUCOSE: ABNORMAL MG/DL
GLUCOSE SERPL-MCNC: 187 MG/DL
GLUCOSE SERPL-MCNC: 187 MG/DL
GLUCOSE SERPL-MCNC: 235 MG/DL
GLUCOSE SERPL-MCNC: 440 MG/DL
HBA1C MFR BLD HPLC: >14 %
HCT VFR BLD AUTO: 44.7 %
HGB BLD-MCNC: 14.5 G/DL
LYMPHOCYTES # BLD AUTO: 1.2 K/UL
LYMPHOCYTES NFR BLD: 15.1 %
MAGNESIUM SERPL-MCNC: 2.7 MG/DL
MCH RBC QN AUTO: 28 PG
MCHC RBC AUTO-ENTMCNC: 32.4 G/DL
MCV RBC AUTO: 86 FL
MONOCYTES # BLD AUTO: 0.8 K/UL
MONOCYTES NFR BLD: 9.8 %
NEUTROPHILS # BLD AUTO: 6 K/UL
NEUTROPHILS NFR BLD: 75 %
PHOSPHATE SERPL-MCNC: 1.6 MG/DL
PLATELET # BLD AUTO: 160 K/UL
PMV BLD AUTO: 12.3 FL
POCT GLUCOSE: 159 MG/DL (ref 70–110)
POCT GLUCOSE: 175 MG/DL (ref 70–110)
POCT GLUCOSE: 178 MG/DL (ref 70–110)
POCT GLUCOSE: 193 MG/DL (ref 70–110)
POCT GLUCOSE: 204 MG/DL (ref 70–110)
POCT GLUCOSE: 217 MG/DL (ref 70–110)
POCT GLUCOSE: 221 MG/DL (ref 70–110)
POCT GLUCOSE: 233 MG/DL (ref 70–110)
POCT GLUCOSE: 241 MG/DL (ref 70–110)
POCT GLUCOSE: 260 MG/DL (ref 70–110)
POCT GLUCOSE: 386 MG/DL (ref 70–110)
POCT GLUCOSE: 418 MG/DL (ref 70–110)
POCT GLUCOSE: 438 MG/DL (ref 70–110)
POCT GLUCOSE: 443 MG/DL (ref 70–110)
POCT GLUCOSE: 450 MG/DL (ref 70–110)
POCT GLUCOSE: 451 MG/DL (ref 70–110)
POCT GLUCOSE: 479 MG/DL (ref 70–110)
POCT GLUCOSE: >500 MG/DL (ref 70–110)
POCT GLUCOSE: >500 MG/DL (ref 70–110)
POTASSIUM SERPL-SCNC: 3.7 MMOL/L
POTASSIUM SERPL-SCNC: 3.9 MMOL/L
POTASSIUM SERPL-SCNC: 3.9 MMOL/L
POTASSIUM SERPL-SCNC: 4 MMOL/L
RBC # BLD AUTO: 5.18 M/UL
SODIUM SERPL-SCNC: 157 MMOL/L
SODIUM SERPL-SCNC: 170 MMOL/L
SODIUM SERPL-SCNC: 171 MMOL/L
SODIUM SERPL-SCNC: 171 MMOL/L
WBC # BLD AUTO: 8 K/UL

## 2019-03-21 PROCEDURE — 36415 COLL VENOUS BLD VENIPUNCTURE: CPT

## 2019-03-21 PROCEDURE — S5571 INSULIN DISPOS PEN 3 ML: HCPCS | Performed by: HOSPITALIST

## 2019-03-21 PROCEDURE — 85025 COMPLETE CBC W/AUTO DIFF WBC: CPT

## 2019-03-21 PROCEDURE — 83735 ASSAY OF MAGNESIUM: CPT

## 2019-03-21 PROCEDURE — 20000000 HC ICU ROOM

## 2019-03-21 PROCEDURE — 84100 ASSAY OF PHOSPHORUS: CPT

## 2019-03-21 PROCEDURE — 63600175 PHARM REV CODE 636 W HCPCS: Performed by: INTERNAL MEDICINE

## 2019-03-21 PROCEDURE — 25000003 PHARM REV CODE 250: Performed by: HOSPITALIST

## 2019-03-21 PROCEDURE — 80048 BASIC METABOLIC PNL TOTAL CA: CPT | Mod: 91

## 2019-03-21 PROCEDURE — 63600175 PHARM REV CODE 636 W HCPCS: Performed by: HOSPITALIST

## 2019-03-21 PROCEDURE — 80048 BASIC METABOLIC PNL TOTAL CA: CPT

## 2019-03-21 PROCEDURE — 25000003 PHARM REV CODE 250: Performed by: INTERNAL MEDICINE

## 2019-03-21 RX ORDER — DEXTROSE MONOHYDRATE 50 MG/ML
INJECTION, SOLUTION INTRAVENOUS CONTINUOUS
Status: DISCONTINUED | OUTPATIENT
Start: 2019-03-21 | End: 2019-03-21

## 2019-03-21 RX ORDER — INSULIN ASPART 100 [IU]/ML
1-10 INJECTION, SOLUTION INTRAVENOUS; SUBCUTANEOUS
Status: DISCONTINUED | OUTPATIENT
Start: 2019-03-21 | End: 2019-03-21

## 2019-03-21 RX ORDER — GLUCAGON 1 MG
1 KIT INJECTION
Status: DISCONTINUED | OUTPATIENT
Start: 2019-03-21 | End: 2019-03-24 | Stop reason: HOSPADM

## 2019-03-21 RX ORDER — IBUPROFEN 200 MG
24 TABLET ORAL
Status: DISCONTINUED | OUTPATIENT
Start: 2019-03-21 | End: 2019-03-21

## 2019-03-21 RX ORDER — INSULIN ASPART 100 [IU]/ML
5 INJECTION, SOLUTION INTRAVENOUS; SUBCUTANEOUS
Status: DISCONTINUED | OUTPATIENT
Start: 2019-03-21 | End: 2019-03-24 | Stop reason: HOSPADM

## 2019-03-21 RX ORDER — IBUPROFEN 200 MG
16 TABLET ORAL
Status: DISCONTINUED | OUTPATIENT
Start: 2019-03-21 | End: 2019-03-24 | Stop reason: HOSPADM

## 2019-03-21 RX ORDER — IBUPROFEN 200 MG
16 TABLET ORAL
Status: DISCONTINUED | OUTPATIENT
Start: 2019-03-21 | End: 2019-03-21

## 2019-03-21 RX ORDER — IBUPROFEN 200 MG
24 TABLET ORAL
Status: DISCONTINUED | OUTPATIENT
Start: 2019-03-21 | End: 2019-03-24 | Stop reason: HOSPADM

## 2019-03-21 RX ORDER — CLONAZEPAM 0.25 MG/1
0.25 TABLET, ORALLY DISINTEGRATING ORAL ONCE AS NEEDED
Status: DISCONTINUED | OUTPATIENT
Start: 2019-03-21 | End: 2019-03-23

## 2019-03-21 RX ORDER — GLUCAGON 1 MG
1 KIT INJECTION
Status: DISCONTINUED | OUTPATIENT
Start: 2019-03-21 | End: 2019-03-21

## 2019-03-21 RX ORDER — INSULIN ASPART 100 [IU]/ML
1-10 INJECTION, SOLUTION INTRAVENOUS; SUBCUTANEOUS
Status: DISCONTINUED | OUTPATIENT
Start: 2019-03-21 | End: 2019-03-24 | Stop reason: HOSPADM

## 2019-03-21 RX ADMIN — INSULIN ASPART 2 UNITS: 100 INJECTION, SOLUTION INTRAVENOUS; SUBCUTANEOUS at 10:03

## 2019-03-21 RX ADMIN — POTASSIUM CHLORIDE: 149 INJECTION, SOLUTION, CONCENTRATE INTRAVENOUS at 05:03

## 2019-03-21 RX ADMIN — SODIUM CHLORIDE 2 UNITS/HR: 9 INJECTION, SOLUTION INTRAVENOUS at 08:03

## 2019-03-21 RX ADMIN — DEXTROSE: 5 SOLUTION INTRAVENOUS at 09:03

## 2019-03-21 RX ADMIN — INSULIN ASPART 5 UNITS: 100 INJECTION, SOLUTION INTRAVENOUS; SUBCUTANEOUS at 07:03

## 2019-03-21 RX ADMIN — INSULIN DETEMIR 10 UNITS: 100 INJECTION, SOLUTION SUBCUTANEOUS at 09:03

## 2019-03-21 RX ADMIN — SODIUM CHLORIDE 1 UNITS/HR: 9 INJECTION, SOLUTION INTRAVENOUS at 09:03

## 2019-03-21 NOTE — SUBJECTIVE & OBJECTIVE
Interval History: Feeling better.    Review of Systems   HENT: Negative for ear discharge and ear pain.    Eyes: Negative for pain and itching.   Cardiovascular: Negative for chest pain and palpitations.   Neurological: Negative for seizures and syncope.     Objective:     Vital Signs (Most Recent):  Temp: 98.7 °F (37.1 °C) (03/21/19 1515)  Pulse: 79 (03/21/19 1515)  Resp: (!) 27 (03/21/19 1515)  BP: 108/70 (03/21/19 1500)  SpO2: 95 % (03/21/19 1515) Vital Signs (24h Range):  Temp:  [97.5 °F (36.4 °C)-98.9 °F (37.2 °C)] 98.7 °F (37.1 °C)  Pulse:  [79-99] 79  Resp:  [14-37] 27  SpO2:  [95 %-100 %] 95 %  BP: (108-143)/(61-89) 108/70     Weight: 63 kg (138 lb 14.2 oz)  Body mass index is 23.84 kg/m².    Intake/Output Summary (Last 24 hours) at 3/21/2019 1607  Last data filed at 3/21/2019 1400  Gross per 24 hour   Intake 1440.7 ml   Output 1515 ml   Net -74.3 ml      Physical Exam   Constitutional: She is oriented to person, place, and time. She appears well-developed. No distress.   HENT:   Head: Normocephalic and atraumatic.   Eyes: Conjunctivae are normal. Right eye exhibits no discharge. Left eye exhibits no discharge.   Neck: Neck supple. No tracheal deviation present.   Cardiovascular: Normal rate, regular rhythm and normal heart sounds.   Pulmonary/Chest: Effort normal and breath sounds normal.   Abdominal: Soft. Bowel sounds are normal.   Musculoskeletal: Normal range of motion. She exhibits no deformity.   Neurological: She is alert and oriented to person, place, and time.   Skin: Skin is warm and dry.       Significant Labs:   BMP:   Recent Labs   Lab 03/21/19  0155 03/21/19  0813   * 187*  187*   * 171*  171*   K 4.0 3.9  3.9   CL >130* >130*  >130*   CO2 21* 27  27   BUN 42* 36*  36*   CREATININE 1.6* 1.4  1.4   CALCIUM 8.6* 8.6*  8.6*   MG 2.7*  --      CBC:   Recent Labs   Lab 03/20/19  1556 03/20/19  1612 03/21/19  0155   WBC 9.36  --  8.00   HGB 16.6*  --  14.5   HCT 52.2* 47 44.7      --  160

## 2019-03-21 NOTE — EICU
New admit    53 y/o F presented with generalized weakness with symptoms of polyuria and polydipsia for the past month.  She was recently diagnosed with DM when she visited an urgent care and was given metformin. She was noted to be in DKA on this presentation and is started on fluids and insulin    Camera assesment:  Tearful, NG placed    Telemetry:  /89  HR 91 O2 97%    · New onset DKA, continue DKA protocol and change fluids accordingly  · Ordered one time dose of clonazepam 0.25 mg for anxiety

## 2019-03-21 NOTE — PLAN OF CARE
Problem: Adult Inpatient Plan of Care  Goal: Plan of Care Review  Outcome: Ongoing (interventions implemented as appropriate)  Pt remains ICU AAO X4, VSS, RA, on insulin gtt, CBG checked qhr, and BMP q6hr, last redraw Na 157 and glucose 440, pt on IVF D5 at 75cc/hr, adequate UO per Hyman Cath, no BM, DM diet tolerated well, plan of care reviewed with pt and spouse, verbalized understanding, pt remains free of injury, safety maintained, no acute distress noted at present time, will continue to monitor.

## 2019-03-21 NOTE — PROGRESS NOTES
[facility] Hospital Medicine  Progress Note    Patient Name: Sarah Mohamud  MRN: [id number]  Patient Class: IP- Inpatient   Admission Date: 3/20/2019  Length of Stay: 1 days  Attending Physician: Jamin Solares MD  Primary Care Provider: ADRIANO Beaulieu        Subjective:     Principal Problem:Diabetic ketoacidosis    HPI:  53 y/o female presents complaining of generalized weakness.  She has recently been feeling bad for the last month.  Patient has been complaining of polydipsia, polyuria and generalized fatigue.  She was evaluated at an Urgent Care 2 days ago and diagnosed with DM.  No previous hx of DM.  Patient was started on Metformin.  Her symptoms have persisted over past 2 days.  Denies any fever or chills.  She had not seen a doctor in many years until being evaluated at Urgent Care.  She presented to ER where she was again noted to be severely hyperglycemic.  Labs also consistent with DKA.  No other complaints.    Hospital Course:  53 y/o female with recent diagnosis of DM presented with DKA.  Admitted to ICU on insulin drip.  Also noted to be hypernatremic and dehydrated with ARF.  Started on aggressive IVF hydration.    Interval History: Feeling better.    Review of Systems   HENT: Negative for ear discharge and ear pain.    Eyes: Negative for pain and itching.   Cardiovascular: Negative for chest pain and palpitations.   Neurological: Negative for seizures and syncope.     Objective:     Vital Signs (Most Recent):  Temp: 98.7 °F (37.1 °C) (03/21/19 1515)  Pulse: 79 (03/21/19 1515)  Resp: (!) 27 (03/21/19 1515)  BP: 108/70 (03/21/19 1500)  SpO2: 95 % (03/21/19 1515) Vital Signs (24h Range):  Temp:  [97.5 °F (36.4 °C)-98.9 °F (37.2 °C)] 98.7 °F (37.1 °C)  Pulse:  [79-99] 79  Resp:  [14-37] 27  SpO2:  [95 %-100 %] 95 %  BP: (108-143)/(61-89) 108/70     Weight: 63 kg (138 lb 14.2 oz)  Body mass index is 23.84 kg/m².    Intake/Output Summary (Last 24 hours) at 3/21/2019 1607  Last  data filed at 3/21/2019 1400  Gross per 24 hour   Intake 1440.7 ml   Output 1515 ml   Net -74.3 ml      Physical Exam   Constitutional: She is oriented to person, place, and time. She appears well-developed. No distress.   HENT:   Head: Normocephalic and atraumatic.   Eyes: Conjunctivae are normal. Right eye exhibits no discharge. Left eye exhibits no discharge.   Neck: Neck supple. No tracheal deviation present.   Cardiovascular: Normal rate, regular rhythm and normal heart sounds.   Pulmonary/Chest: Effort normal and breath sounds normal.   Abdominal: Soft. Bowel sounds are normal.   Musculoskeletal: Normal range of motion. She exhibits no deformity.   Neurological: She is alert and oriented to person, place, and time.   Skin: Skin is warm and dry.       Significant Labs:   BMP:   Recent Labs   Lab 03/21/19  0155 03/21/19  0813   * 187*  187*   * 171*  171*   K 4.0 3.9  3.9   CL >130* >130*  >130*   CO2 21* 27  27   BUN 42* 36*  36*   CREATININE 1.6* 1.4  1.4   CALCIUM 8.6* 8.6*  8.6*   MG 2.7*  --      CBC:   Recent Labs   Lab 03/20/19  1556 03/20/19  1612 03/21/19  0155   WBC 9.36  --  8.00   HGB 16.6*  --  14.5   HCT 52.2* 47 44.7     --  160     Assessment/Plan:      * Diabetic ketoacidosis    Newly diagnosed DM presents with hyperglycemia and high anion gap metabolic acidosis, consistent with DKA.  Placed on insulin drip and admitted to ICU.  Start aggressive IVF hydration.  Improved glucose with closure of AG.  DKA has resolved.  Need to continue D5 to treat severe hypernatremia, so continue insulin drip until able to wean off D5.       Hypernatremia    Initially probably secondary to fluid depletion.  Started on aggressive IVF hydration with increasing Na.  Severe hypernatremia probably iatrogenic secondary to IVF's plus related to osmotic shift when treating hyperglycemia.  Discussed with critical care.  Treat with D5.  Will need to continue insulin drip while on D5.        Metabolic acidosis           Diabetes mellitus, type 2    Newly diagnosed.  Check HgA1c.  Uncontrolled with hyperglycemia.       ARF (acute renal failure)    Probably pre renal secondary to dehydration.  Should improve with IVF's.  Avoid nephrotoxic medications.  Improving on IVF's.       Hyperkalemia    Will improve with insulin treatment.  Continue to monitor.  Resolved          VTE Risk Mitigation (From admission, onward)        Ordered     Place BUNNY hose  Until discontinued      03/20/19 1755     IP VTE LOW RISK PATIENT  Once      03/20/19 1755          Critical care time spent on the evaluation and treatment of severe organ dysfunction, review of pertinent labs and imaging studies, discussions with consulting providers and discussions with patient/family: 40 minutes.    Jamin Solares MD  Department of Hospital Medicine   [facility]

## 2019-03-21 NOTE — CONSULTS
Consult received for DM/Nutrition education. Pt very groggy still; will f/u with education when pt  more alert.  Thank you,  Ny Merchant RD

## 2019-03-21 NOTE — ASSESSMENT & PLAN NOTE
Probably pre renal secondary to dehydration.  Should improve with IVF's.  Avoid nephrotoxic medications.  Improving on IVF's.

## 2019-03-21 NOTE — PLAN OF CARE
03/21/19 1058   Discharge Assessment   Assessment Type Discharge Planning Assessment   Confirmed/corrected address and phone number on facesheet? Yes   Assessment information obtained from? Patient   Prior to hospitilization cognitive status: Alert/Oriented   Prior to hospitalization functional status: Independent   Current cognitive status: Alert/Oriented   Current Functional Status: Independent   Facility Arrived From: home   Lives With significant other   Able to Return to Prior Arrangements yes   Is patient able to care for self after discharge? Unable to determine at this time (comments)   Who are your caregiver(s) and their phone number(s)? King Longoria [phone]   Patient's perception of discharge disposition home or selfcare   Readmission Within the Last 30 Days no previous admission in last 30 days   Patient currently being followed by outpatient case management? No   Patient currently receives any other outside agency services? No   Equipment Currently Used at Home none   Do you have any problems affording any of your prescribed medications? No   Is the patient taking medications as prescribed? yes   Does the patient have transportation home? Yes   Transportation Anticipated family or friend will provide   Dialysis Name and Scheduled days N/A   Does the patient receive services at the [facility]madin Clinic? No   Discharge Plan A Home with family   Discharge Plan B Home with family   DME Needed Upon Discharge  walker, rolling;glucometer   Patient/Family in Agreement with Plan yes       SW met with pt at bedside. SW explained her role in Care Management. Pt voiced understanding. SW inquired about HELP AT HOME. Pt stated that she will have King at home to help for support. SW voiced understanding. SW inquired about responsibilities when it comes to  MANAGING HER/HIS HEALTH at home and what it entails. Pt inquired about details. SW informed pt of RESPONSIBILITIES of:    1. Follow up appointments  2. Getting  "Prescriptions filled  3. Taking medications as prescribed.     Pt voiced understanding.  SW explained "My Health Packet" blue folder and the pink and green tabs that are on the folder as well. Discharge Brochure given to pt with Care Team information. Pt voiced understanding.     Pt's pharmacy:   [facility] [address] [facility] [address]  [address]  [place] [address]  Phone: [phone] Fax: [phone]      Pt's preference for appointments: mornings       "

## 2019-03-21 NOTE — HOSPITAL COURSE
55 y/o female with recent diagnosis of DM presented with DKA.  Admitted to ICU on insulin drip.  Also noted to be hypernatremic and dehydrated with ARF.  Started on aggressive IVF hydration.  HgA1c>14.  DKA resolved, but patient was severely hypernatremic with Na of 171.  This was probably iatrogenic secondary to IVF's and secondary to osmotic shift from correcting hyperglycemia.  Patient was placed on D5 to correct hypernatremia.  She was kept on insulin drip while on D5.  Sodium improving.  Stopped D5 and insulin drip.  Placed on SQ insulin.  Dose adjusted to 20 U of long acting and 5U of short acting. Resumed on metformin 500 mg, which is dose recently started, per patient's recollection. Is to be up titrated as outpatient depending on side effects (usually GI), which she has none. Prescribed equipment for accuchecks. Nursing taught patient how to do own insulin injections and accuchecks. Close f/u with PCP. Diabetic diet. Activity as tolerated.

## 2019-03-21 NOTE — ASSESSMENT & PLAN NOTE
Initially probably secondary to fluid depletion.  Started on aggressive IVF hydration with increasing Na.  Severe hypernatremia probably iatrogenic secondary to IVF's plus related to osmotic shift when treating hyperglycemia.  Discussed with critical care.  Treat with D5.  Will need to continue insulin drip while on D5.

## 2019-03-21 NOTE — ASSESSMENT & PLAN NOTE
Newly diagnosed DM presents with hyperglycemia and high anion gap metabolic acidosis, consistent with DKA.  Placed on insulin drip and admitted to ICU.  Start aggressive IVF hydration.  Improved glucose with closure of AG.  DKA has resolved.  Need to continue D5 to treat severe hypernatremia, so continue insulin drip until able to wean off D5.

## 2019-03-22 LAB
ANION GAP SERPL CALC-SCNC: 9 MMOL/L
BACTERIA UR CULT: NORMAL
BUN SERPL-MCNC: 18 MG/DL
CALCIUM SERPL-MCNC: 8 MG/DL
CHLORIDE SERPL-SCNC: 120 MMOL/L
CO2 SERPL-SCNC: 27 MMOL/L
CREAT SERPL-MCNC: 0.9 MG/DL
EST. GFR  (AFRICAN AMERICAN): >60 ML/MIN/1.73 M^2
EST. GFR  (NON AFRICAN AMERICAN): >60 ML/MIN/1.73 M^2
GLUCOSE SERPL-MCNC: 195 MG/DL
POCT GLUCOSE: 239 MG/DL (ref 70–110)
POCT GLUCOSE: 334 MG/DL (ref 70–110)
POCT GLUCOSE: 463 MG/DL (ref 70–110)
POCT GLUCOSE: >500 MG/DL (ref 70–110)
POTASSIUM SERPL-SCNC: 3.2 MMOL/L
SODIUM SERPL-SCNC: 156 MMOL/L

## 2019-03-22 PROCEDURE — 80048 BASIC METABOLIC PNL TOTAL CA: CPT

## 2019-03-22 PROCEDURE — 36415 COLL VENOUS BLD VENIPUNCTURE: CPT

## 2019-03-22 PROCEDURE — 63600175 PHARM REV CODE 636 W HCPCS: Performed by: HOSPITALIST

## 2019-03-22 PROCEDURE — 11000001 HC ACUTE MED/SURG PRIVATE ROOM

## 2019-03-22 PROCEDURE — S5571 INSULIN DISPOS PEN 3 ML: HCPCS | Performed by: HOSPITALIST

## 2019-03-22 PROCEDURE — 94761 N-INVAS EAR/PLS OXIMETRY MLT: CPT

## 2019-03-22 PROCEDURE — 25000003 PHARM REV CODE 250: Performed by: HOSPITALIST

## 2019-03-22 RX ORDER — POTASSIUM CHLORIDE 20 MEQ/1
40 TABLET, EXTENDED RELEASE ORAL ONCE
Status: COMPLETED | OUTPATIENT
Start: 2019-03-22 | End: 2019-03-22

## 2019-03-22 RX ADMIN — INSULIN ASPART 10 UNITS: 100 INJECTION, SOLUTION INTRAVENOUS; SUBCUTANEOUS at 01:03

## 2019-03-22 RX ADMIN — INSULIN ASPART 4 UNITS: 100 INJECTION, SOLUTION INTRAVENOUS; SUBCUTANEOUS at 08:03

## 2019-03-22 RX ADMIN — INSULIN ASPART 8 UNITS: 100 INJECTION, SOLUTION INTRAVENOUS; SUBCUTANEOUS at 06:03

## 2019-03-22 RX ADMIN — INSULIN ASPART 5 UNITS: 100 INJECTION, SOLUTION INTRAVENOUS; SUBCUTANEOUS at 06:03

## 2019-03-22 RX ADMIN — INSULIN DETEMIR 10 UNITS: 100 INJECTION, SOLUTION SUBCUTANEOUS at 08:03

## 2019-03-22 RX ADMIN — INSULIN ASPART 4 UNITS: 100 INJECTION, SOLUTION INTRAVENOUS; SUBCUTANEOUS at 07:03

## 2019-03-22 RX ADMIN — INSULIN ASPART 5 UNITS: 100 INJECTION, SOLUTION INTRAVENOUS; SUBCUTANEOUS at 07:03

## 2019-03-22 RX ADMIN — INSULIN ASPART 5 UNITS: 100 INJECTION, SOLUTION INTRAVENOUS; SUBCUTANEOUS at 01:03

## 2019-03-22 RX ADMIN — POTASSIUM CHLORIDE 40 MEQ: 1500 TABLET, EXTENDED RELEASE ORAL at 10:03

## 2019-03-22 NOTE — CONSULTS
Food & Nutrition  Education    Diet Education: DM and Nutrition  Time Spent: 15 min  Learners: Patient and family      Nutrition Education provided with handouts:   1. DM and nutrition overview  2. Plate method  3. Label reading      Comments: Met with pt and family and reviewed basics of CHO identification, portion control, meal guidelines and label reading. Pt receptive with appropriate questions. Provided f/u resources for outpatient RD and group classes.       All questions and concerns answered. Dietitian's contact information provided.       Please Re-consult as needed        Thanks!    Ny Merchant RD

## 2019-03-22 NOTE — NURSING
Patient oriented to room. Blue folder and white board explained. Questions encouraged and answered. Patient verbalized understanding. Bed in low position and locked. Call light in reach. Bed alarm set. No distress noted. Will continue to monitor, will continue with plan of care. Family at the bedside. Diabetic teaching provided.

## 2019-03-22 NOTE — ASSESSMENT & PLAN NOTE
Will improve with insulin treatment.  Continue to monitor.  Resolved   Now with hypokalemia--replace orally as needed.

## 2019-03-22 NOTE — PROGRESS NOTES
[facility] Hospital Medicine  Progress Note    Patient Name: Sarah Mohamud  MRN: [id number]  Patient Class: IP- Inpatient   Admission Date: 3/20/2019  Length of Stay: 2 days  Attending Physician: Jamin Solares MD  Primary Care Provider: ADRIANO Beaulieu        Subjective:     Principal Problem:Diabetic ketoacidosis    HPI:  53 y/o female presents complaining of generalized weakness.  She has recently been feeling bad for the last month.  Patient has been complaining of polydipsia, polyuria and generalized fatigue.  She was evaluated at an Urgent Care 2 days ago and diagnosed with DM.  No previous hx of DM.  Patient was started on Metformin.  Her symptoms have persisted over past 2 days.  Denies any fever or chills.  She had not seen a doctor in many years until being evaluated at Urgent Care.  She presented to ER where she was again noted to be severely hyperglycemic.  Labs also consistent with DKA.  No other complaints.    Hospital Course:  53 y/o female with recent diagnosis of DM presented with DKA.  Admitted to ICU on insulin drip.  Also noted to be hypernatremic and dehydrated with ARF.  Started on aggressive IVF hydration.  HgA1c>14.  DKA resolved, but patient was severely hypernatremic with Na of 171.  This was probably iatrogenic secondary to IVF's and secondary to osmotic shift from correcting hyperglycemia.  Patient was placed on D5 to correct hypernatremia.  She was kept on insulin drip while on D5.  Sodium improving.  Stopped D5 and insulin drip.  Placed on SQ insulin.      Interval History: Feeling well. No complaints this morning.    Review of Systems   HENT: Negative for ear discharge and ear pain.    Eyes: Negative for pain and itching.   Cardiovascular: Negative for chest pain and palpitations.   Neurological: Negative for seizures and syncope.     Objective:     Vital Signs (Most Recent):  Temp: 97.7 °F (36.5 °C) (03/22/19 0715)  Pulse: 74 (03/22/19 0826)  Resp: (!) 27  (03/22/19 0826)  BP: 111/73 (03/22/19 0800)  SpO2: 98 % (03/22/19 0826) Vital Signs (24h Range):  Temp:  [97.7 °F (36.5 °C)-98.9 °F (37.2 °C)] 97.7 °F (36.5 °C)  Pulse:  [65-99] 74  Resp:  [13-29] 27  SpO2:  [93 %-99 %] 98 %  BP: (102-136)/(64-86) 111/73     Weight: 63 kg (138 lb 14.2 oz)  Body mass index is 23.84 kg/m².    Intake/Output Summary (Last 24 hours) at 3/22/2019 0918  Last data filed at 3/22/2019 0800  Gross per 24 hour   Intake 3406.95 ml   Output 1535 ml   Net 1871.95 ml      Physical Exam   Constitutional: She is oriented to person, place, and time. She appears well-developed. No distress.   HENT:   Head: Normocephalic and atraumatic.   Eyes: Conjunctivae are normal. Right eye exhibits no discharge. Left eye exhibits no discharge.   Neck: Neck supple. No tracheal deviation present.   Cardiovascular: Normal rate, regular rhythm and normal heart sounds.   Pulmonary/Chest: Effort normal and breath sounds normal.   Abdominal: Soft. Bowel sounds are normal.   Musculoskeletal: Normal range of motion. She exhibits no deformity.   Neurological: She is alert and oriented to person, place, and time.   Skin: Skin is warm and dry.       Significant Labs:   BMP:   Recent Labs   Lab 03/21/19  0155  03/22/19  0252   *   < > 195*   *   < > 156*   K 4.0   < > 3.2*   CL >130*   < > 120*   CO2 21*   < > 27   BUN 42*   < > 18   CREATININE 1.6*   < > 0.9   CALCIUM 8.6*   < > 8.0*   MG 2.7*  --   --     < > = values in this interval not displayed.     CBC:   Recent Labs   Lab 03/20/19  1556 03/20/19  1612 03/21/19  0155   WBC 9.36  --  8.00   HGB 16.6*  --  14.5   HCT 52.2* 47 44.7     --  160     Assessment/Plan:      * Diabetic ketoacidosis    Newly diagnosed DM presents with hyperglycemia and high anion gap metabolic acidosis, consistent with DKA.  Placed on insulin drip and admitted to ICU.  Start aggressive IVF hydration.  Improved glucose with closure of AG.  DKA has resolved.  Need to continue D5  to treat severe hypernatremia, so continue insulin drip until able to wean off D5.  Improved hypernatremia.  Stopped D5 and insulin drip.    Started on SQ insulin, diabetic diet and insulin sliding scale.       Hypernatremia    Initially probably secondary to fluid depletion.  Started on aggressive IVF hydration with increasing Na.  Severe hypernatremia probably iatrogenic secondary to IVF's plus related to osmotic shift when treating hyperglycemia.  Discussed with critical care.  Treat with D5.  Will need to continue insulin drip while on D5.  Improving.  Stopping D5.       Metabolic acidosis           Diabetes mellitus, type 2    Newly diagnosed.  HgA1c>14.  Uncontrolled with hyperglycemia.  Needs diabetic teaching.     ARF (acute renal failure)    Probably pre renal secondary to dehydration.  Should improve with IVF's.  Avoid nephrotoxic medications.  Improving on IVF's.  Resolved      Hyperkalemia    Will improve with insulin treatment.  Continue to monitor.  Resolved   Now with hypokalemia--replace orally as needed.       VTE Risk Mitigation (From admission, onward)        Ordered     Place UBNNY hose  Until discontinued      03/20/19 1755     IP VTE LOW RISK PATIENT  Once      03/20/19 1755            Jamin Solares MD  Department of Hospital Medicine   [facility]

## 2019-03-22 NOTE — PROVIDER TRANSFER
Transfer Note    55 y/o female with recent diagnosis of DM presented with DKA.  Admitted to ICU on insulin drip.  Also noted to be hypernatremic and dehydrated with ARF.  Started on aggressive IVF hydration.  HgA1c>14.  DKA resolved, but patient was severely hypernatremic with Na of 171.  This was probably iatrogenic secondary to IVF's and secondary to osmotic shift from correcting hyperglycemia.  Patient was placed on D5 to correct hypernatremia.  She was kept on insulin drip while on D5.  Sodium improving.  Stopped D5 and insulin drip.  Placed on SQ insulin.  Ok to transfer out of ICU.  Patient was just started on oral DM medications prior to admission.  Would probably discharge on oral medications with nightly dose of Levemir and follow up with PCP or Endo for adjustments.  Home in 1-2 days.

## 2019-03-22 NOTE — SUBJECTIVE & OBJECTIVE
Interval History: Feeling well. No complaints this morning.    Review of Systems   HENT: Negative for ear discharge and ear pain.    Eyes: Negative for pain and itching.   Cardiovascular: Negative for chest pain and palpitations.   Neurological: Negative for seizures and syncope.     Objective:     Vital Signs (Most Recent):  Temp: 97.7 °F (36.5 °C) (03/22/19 0715)  Pulse: 74 (03/22/19 0826)  Resp: (!) 27 (03/22/19 0826)  BP: 111/73 (03/22/19 0800)  SpO2: 98 % (03/22/19 0826) Vital Signs (24h Range):  Temp:  [97.7 °F (36.5 °C)-98.9 °F (37.2 °C)] 97.7 °F (36.5 °C)  Pulse:  [65-99] 74  Resp:  [13-29] 27  SpO2:  [93 %-99 %] 98 %  BP: (102-136)/(64-86) 111/73     Weight: 63 kg (138 lb 14.2 oz)  Body mass index is 23.84 kg/m².    Intake/Output Summary (Last 24 hours) at 3/22/2019 0918  Last data filed at 3/22/2019 0800  Gross per 24 hour   Intake 3406.95 ml   Output 1535 ml   Net 1871.95 ml      Physical Exam   Constitutional: She is oriented to person, place, and time. She appears well-developed. No distress.   HENT:   Head: Normocephalic and atraumatic.   Eyes: Conjunctivae are normal. Right eye exhibits no discharge. Left eye exhibits no discharge.   Neck: Neck supple. No tracheal deviation present.   Cardiovascular: Normal rate, regular rhythm and normal heart sounds.   Pulmonary/Chest: Effort normal and breath sounds normal.   Abdominal: Soft. Bowel sounds are normal.   Musculoskeletal: Normal range of motion. She exhibits no deformity.   Neurological: She is alert and oriented to person, place, and time.   Skin: Skin is warm and dry.       Significant Labs:   BMP:   Recent Labs   Lab 03/21/19  0155  03/22/19  0252   *   < > 195*   *   < > 156*   K 4.0   < > 3.2*   CL >130*   < > 120*   CO2 21*   < > 27   BUN 42*   < > 18   CREATININE 1.6*   < > 0.9   CALCIUM 8.6*   < > 8.0*   MG 2.7*  --   --     < > = values in this interval not displayed.     CBC:   Recent Labs   Lab 03/20/19  1556 03/20/19  1612  03/21/19  0155   WBC 9.36  --  8.00   HGB 16.6*  --  14.5   HCT 52.2* 47 44.7     --  160

## 2019-03-22 NOTE — ASSESSMENT & PLAN NOTE
Probably pre renal secondary to dehydration.  Should improve with IVF's.  Avoid nephrotoxic medications.  Improving on IVF's.  Resolved

## 2019-03-22 NOTE — PLAN OF CARE
Problem: Adult Inpatient Plan of Care  Goal: Plan of Care Review  Outcome: Ongoing (interventions implemented as appropriate)  Pt AAO x 4. On RA. NSR on the monitor. VSS. Afebrile. Hyman remains; UO WNL. No BM's this shift. Hourly accu-checks d'c'd at the start of shift. Accu-checks AC & HS. Pt free of falls, injuries, and skin breakdown. Pt and spouse updated on plan of care.

## 2019-03-22 NOTE — ASSESSMENT & PLAN NOTE
Initially probably secondary to fluid depletion.  Started on aggressive IVF hydration with increasing Na.  Severe hypernatremia probably iatrogenic secondary to IVF's plus related to osmotic shift when treating hyperglycemia.  Discussed with critical care.  Treat with D5.  Will need to continue insulin drip while on D5.  Improving.  Stopping D5.

## 2019-03-22 NOTE — NURSING TRANSFER
Nursing Transfer Note      3/22/2019     Transfer from ICU to  [facility]      Transfer via Bed     Transfer with pt's Belonging and Insulin's     Transported by Transport personal     Medicines sent Insulin X2    Chart send with patient: YES       Notified:  and Son at bedside     Patient reassessed at: 03/22/19 at 0715    Upon arrival to floor: call light within reach family remains at bedside,

## 2019-03-22 NOTE — ASSESSMENT & PLAN NOTE
Newly diagnosed DM presents with hyperglycemia and high anion gap metabolic acidosis, consistent with DKA.  Placed on insulin drip and admitted to ICU.  Start aggressive IVF hydration.  Improved glucose with closure of AG.  DKA has resolved.  Need to continue D5 to treat severe hypernatremia, so continue insulin drip until able to wean off D5.  Improved hypernatremia.  Stopped D5 and insulin drip.    Started on SQ insulin, diabetic diet and insulin sliding scale.

## 2019-03-23 LAB
ANION GAP SERPL CALC-SCNC: 6 MMOL/L
BUN SERPL-MCNC: 14 MG/DL
CALCIUM SERPL-MCNC: 7.9 MG/DL
CHLORIDE SERPL-SCNC: 112 MMOL/L
CO2 SERPL-SCNC: 29 MMOL/L
CREAT SERPL-MCNC: 0.7 MG/DL
EST. GFR  (AFRICAN AMERICAN): >60 ML/MIN/1.73 M^2
EST. GFR  (NON AFRICAN AMERICAN): >60 ML/MIN/1.73 M^2
GLUCOSE SERPL-MCNC: 210 MG/DL
POCT GLUCOSE: 238 MG/DL (ref 70–110)
POCT GLUCOSE: 315 MG/DL (ref 70–110)
POCT GLUCOSE: 317 MG/DL (ref 70–110)
POCT GLUCOSE: 317 MG/DL (ref 70–110)
POCT GLUCOSE: 366 MG/DL (ref 70–110)
POTASSIUM SERPL-SCNC: 3.8 MMOL/L
SODIUM SERPL-SCNC: 147 MMOL/L

## 2019-03-23 PROCEDURE — 11000001 HC ACUTE MED/SURG PRIVATE ROOM

## 2019-03-23 PROCEDURE — 36415 COLL VENOUS BLD VENIPUNCTURE: CPT

## 2019-03-23 PROCEDURE — 25000003 PHARM REV CODE 250: Performed by: INTERNAL MEDICINE

## 2019-03-23 PROCEDURE — 80048 BASIC METABOLIC PNL TOTAL CA: CPT

## 2019-03-23 RX ORDER — METFORMIN HYDROCHLORIDE 500 MG/1
500 TABLET ORAL
Status: DISCONTINUED | OUTPATIENT
Start: 2019-03-23 | End: 2019-03-24 | Stop reason: HOSPADM

## 2019-03-23 RX ORDER — ENOXAPARIN SODIUM 100 MG/ML
40 INJECTION SUBCUTANEOUS EVERY 24 HOURS
Status: DISCONTINUED | OUTPATIENT
Start: 2019-03-23 | End: 2019-03-24 | Stop reason: HOSPADM

## 2019-03-23 RX ADMIN — INSULIN ASPART 4 UNITS: 100 INJECTION, SOLUTION INTRAVENOUS; SUBCUTANEOUS at 08:03

## 2019-03-23 RX ADMIN — INSULIN ASPART 5 UNITS: 100 INJECTION, SOLUTION INTRAVENOUS; SUBCUTANEOUS at 12:03

## 2019-03-23 RX ADMIN — METFORMIN HYDROCHLORIDE 500 MG: 500 TABLET ORAL at 05:03

## 2019-03-23 RX ADMIN — INSULIN ASPART 10 UNITS: 100 INJECTION, SOLUTION INTRAVENOUS; SUBCUTANEOUS at 05:03

## 2019-03-23 RX ADMIN — INSULIN ASPART 5 UNITS: 100 INJECTION, SOLUTION INTRAVENOUS; SUBCUTANEOUS at 08:03

## 2019-03-23 RX ADMIN — INSULIN ASPART 8 UNITS: 100 INJECTION, SOLUTION INTRAVENOUS; SUBCUTANEOUS at 12:03

## 2019-03-23 RX ADMIN — INSULIN ASPART 5 UNITS: 100 INJECTION, SOLUTION INTRAVENOUS; SUBCUTANEOUS at 05:03

## 2019-03-23 NOTE — PLAN OF CARE
Problem: Adult Inpatient Plan of Care  Goal: Plan of Care Review  Outcome: Ongoing (interventions implemented as appropriate)   03/23/19 0007   Plan of Care Review   Plan of Care Reviewed With patient   A+OX4. Pt free from falls, injury, and trauma. VSS. Afebrile. BS controled with long acting and SS insulin. Pt educated verbally on DM and on how to an insulin pen but she would benefit from more education. Pt weak on her feet and need assistance when she ambulates. Pt resting comfortably. Will continue to monitor.

## 2019-03-23 NOTE — SUBJECTIVE & OBJECTIVE
Interval History: Feeling well. No complaints this morning. BG still not at goal    Review of Systems   HENT: Negative for ear discharge and ear pain.    Eyes: Negative for pain and itching.   Cardiovascular: Negative for chest pain and palpitations.   Neurological: Negative for seizures and syncope.     Objective:     Vital Signs (Most Recent):  Temp: 97.8 °F (36.6 °C) (03/23/19 0720)  Pulse: 69 (03/23/19 0720)  Resp: 18 (03/23/19 0720)  BP: 103/67 (03/23/19 0720)  SpO2: 100 % (03/23/19 0720) Vital Signs (24h Range):  Temp:  [97.8 °F (36.6 °C)-98.6 °F (37 °C)] 97.8 °F (36.6 °C)  Pulse:  [66-86] 69  Resp:  [18] 18  SpO2:  [99 %-100 %] 100 %  BP: (103-111)/(65-76) 103/67     Weight: 63 kg (138 lb 14.2 oz)  Body mass index is 23.84 kg/m².    Intake/Output Summary (Last 24 hours) at 3/23/2019 1345  Last data filed at 3/23/2019 0850  Gross per 24 hour   Intake 240 ml   Output --   Net 240 ml      Physical Exam   Constitutional: She is oriented to person, place, and time. She appears well-developed. No distress.   HENT:   Head: Normocephalic and atraumatic.   Eyes: Conjunctivae are normal. Right eye exhibits no discharge. Left eye exhibits no discharge.   Neck: Neck supple. No tracheal deviation present.   Cardiovascular: Normal rate, regular rhythm and normal heart sounds.   Pulmonary/Chest: Effort normal and breath sounds normal.   Abdominal: Soft. Bowel sounds are normal.   Musculoskeletal: Normal range of motion. She exhibits no deformity.   Neurological: She is alert and oriented to person, place, and time.   Skin: Skin is warm and dry.       Significant Labs:   BMP:   Recent Labs   Lab 03/23/19  0343   *   *   K 3.8   *   CO2 29   BUN 14   CREATININE 0.7   CALCIUM 7.9*     CBC:   No results for input(s): WBC, HGB, HCT, PLT in the last 48 hours.

## 2019-03-23 NOTE — NURSING
Pt educated on insulin administration and SSI w/ return demostration.  Patient will be educated on BCG monitored/accuchecks.

## 2019-03-23 NOTE — ASSESSMENT & PLAN NOTE
Newly diagnosed this week  HgA1c>14%  Uncontrolled with hyperglycemia.  Diabetic teaching.  Resume metformin. Continue adjusting insulin regimen  Will need insulin and supplies at discharge

## 2019-03-23 NOTE — PLAN OF CARE
Problem: Adult Inpatient Plan of Care  Goal: Plan of Care Review  Outcome: Ongoing (interventions implemented as appropriate)   03/23/19 3614   Plan of Care Review   Plan of Care Reviewed With patient   Progress improving     Patient oriented x4 and ambulatory in room.  BCG monitoring and insulin administration done by patient as new DM education.  Tolerating ADA diet; educating on insulin types and diet control.  Spouse at bedside.  Voiding w/o difficulty. Will continue to monitor.

## 2019-03-23 NOTE — NURSING
Reviewed accuchecks and insulin adminstration w/ patient. Return demonstration was successul.  Will continue educating patient and spouse.

## 2019-03-23 NOTE — ASSESSMENT & PLAN NOTE
Newly diagnosed DM earlier this week who presents with hyperglycemia and high anion gap metabolic acidosis, consistent with DKA.  Resolved

## 2019-03-23 NOTE — PROGRESS NOTES
[facility] Hospital Medicine  Progress Note    Patient Name: Saarh Mohamud  MRN: [id number]  Patient Class: IP- Inpatient   Admission Date: 3/20/2019  Length of Stay: 3 days  Attending Physician: Taty Skaggs MD  Primary Care Provider: ADRIANO Beaulieu        Subjective:     Principal Problem:Diabetic ketoacidosis    HPI:  55 y/o female presents complaining of generalized weakness.  She has recently been feeling bad for the last month.  Patient has been complaining of polydipsia, polyuria and generalized fatigue.  She was evaluated at an Urgent Care 2 days ago and diagnosed with DM.  No previous hx of DM.  Patient was started on Metformin.  Her symptoms have persisted over past 2 days.  Denies any fever or chills.  She had not seen a doctor in many years until being evaluated at Urgent Care.  She presented to ER where she was again noted to be severely hyperglycemic.  Labs also consistent with DKA.  No other complaints.    Hospital Course:  55 y/o female with recent diagnosis of DM presented with DKA.  Admitted to ICU on insulin drip.  Also noted to be hypernatremic and dehydrated with ARF.  Started on aggressive IVF hydration.  HgA1c>14.  DKA resolved, but patient was severely hypernatremic with Na of 171.  This was probably iatrogenic secondary to IVF's and secondary to osmotic shift from correcting hyperglycemia.  Patient was placed on D5 to correct hypernatremia.  She was kept on insulin drip while on D5.  Sodium improving.  Stopped D5 and insulin drip.  Placed on SQ insulin.      Interval History: Feeling well. No complaints this morning. BG still not at goal    Review of Systems   HENT: Negative for ear discharge and ear pain.    Eyes: Negative for pain and itching.   Cardiovascular: Negative for chest pain and palpitations.   Neurological: Negative for seizures and syncope.     Objective:     Vital Signs (Most Recent):  Temp: 97.8 °F (36.6 °C) (03/23/19 0720)  Pulse: 69 (03/23/19  0720)  Resp: 18 (03/23/19 0720)  BP: 103/67 (03/23/19 0720)  SpO2: 100 % (03/23/19 0720) Vital Signs (24h Range):  Temp:  [97.8 °F (36.6 °C)-98.6 °F (37 °C)] 97.8 °F (36.6 °C)  Pulse:  [66-86] 69  Resp:  [18] 18  SpO2:  [99 %-100 %] 100 %  BP: (103-111)/(65-76) 103/67     Weight: 63 kg (138 lb 14.2 oz)  Body mass index is 23.84 kg/m².    Intake/Output Summary (Last 24 hours) at 3/23/2019 1345  Last data filed at 3/23/2019 0850  Gross per 24 hour   Intake 240 ml   Output --   Net 240 ml      Physical Exam   Constitutional: She is oriented to person, place, and time. She appears well-developed. No distress.   HENT:   Head: Normocephalic and atraumatic.   Eyes: Conjunctivae are normal. Right eye exhibits no discharge. Left eye exhibits no discharge.   Neck: Neck supple. No tracheal deviation present.   Cardiovascular: Normal rate, regular rhythm and normal heart sounds.   Pulmonary/Chest: Effort normal and breath sounds normal.   Abdominal: Soft. Bowel sounds are normal.   Musculoskeletal: Normal range of motion. She exhibits no deformity.   Neurological: She is alert and oriented to person, place, and time.   Skin: Skin is warm and dry.       Significant Labs:   BMP:   Recent Labs   Lab 03/23/19  0343   *   *   K 3.8   *   CO2 29   BUN 14   CREATININE 0.7   CALCIUM 7.9*     CBC:   No results for input(s): WBC, HGB, HCT, PLT in the last 48 hours.    Assessment/Plan:      * Diabetic ketoacidosis    Newly diagnosed DM earlier this week who presents with hyperglycemia and high anion gap metabolic acidosis, consistent with DKA.  Resolved          Metabolic acidosis    Resolved        Diabetes mellitus, type 2    Newly diagnosed this week  HgA1c>14%  Uncontrolled with hyperglycemia.  Diabetic teaching.  Resume metformin. Continue adjusting insulin regimen  Will need insulin and supplies at discharge     ARF (acute renal failure)    Probably pre renal secondary to dehydration.  Resolved with fluids      Hypernatremia    Much improved       Hyperkalemia    Resolved        VTE Risk Mitigation (From admission, onward)        Ordered     enoxaparin injection 40 mg  Daily      03/23/19 1345     Place BUNNY hose  Until discontinued      03/20/19 1755     IP VTE LOW RISK PATIENT  Once      03/20/19 1755          Home tomorrow    Taty Higuera MD  Department of Hospital Medicine   [facility]

## 2019-03-24 VITALS
BODY MASS INDEX: 23.71 KG/M2 | HEART RATE: 80 BPM | RESPIRATION RATE: 17 BRPM | HEIGHT: 64 IN | TEMPERATURE: 98 F | OXYGEN SATURATION: 98 % | SYSTOLIC BLOOD PRESSURE: 100 MMHG | WEIGHT: 138.88 LBS | DIASTOLIC BLOOD PRESSURE: 65 MMHG

## 2019-03-24 LAB
ANION GAP SERPL CALC-SCNC: 5 MMOL/L (ref 8–16)
BUN SERPL-MCNC: 12 MG/DL (ref 6–20)
CALCIUM SERPL-MCNC: 7.9 MG/DL (ref 8.7–10.5)
CHLORIDE SERPL-SCNC: 108 MMOL/L (ref 95–110)
CO2 SERPL-SCNC: 30 MMOL/L (ref 23–29)
CREAT SERPL-MCNC: 0.7 MG/DL (ref 0.5–1.4)
EST. GFR  (AFRICAN AMERICAN): >60 ML/MIN/1.73 M^2
EST. GFR  (NON AFRICAN AMERICAN): >60 ML/MIN/1.73 M^2
GLUCOSE SERPL-MCNC: 172 MG/DL (ref 70–110)
POCT GLUCOSE: 128 MG/DL (ref 70–110)
POCT GLUCOSE: 185 MG/DL (ref 70–110)
POTASSIUM SERPL-SCNC: 3.4 MMOL/L (ref 3.5–5.1)
SODIUM SERPL-SCNC: 143 MMOL/L (ref 136–145)

## 2019-03-24 PROCEDURE — 80048 BASIC METABOLIC PNL TOTAL CA: CPT

## 2019-03-24 PROCEDURE — 25000003 PHARM REV CODE 250: Performed by: INTERNAL MEDICINE

## 2019-03-24 PROCEDURE — 36415 COLL VENOUS BLD VENIPUNCTURE: CPT

## 2019-03-24 RX ORDER — INSULIN GLARGINE 100 [IU]/ML
20 INJECTION, SOLUTION SUBCUTANEOUS NIGHTLY
Qty: 18 ML | Refills: 3 | Status: SHIPPED | OUTPATIENT
Start: 2019-03-24 | End: 2020-10-27 | Stop reason: SDUPTHER

## 2019-03-24 RX ORDER — METFORMIN HYDROCHLORIDE 500 MG/1
500 TABLET ORAL
Qty: 90 TABLET | Refills: 3 | Status: SHIPPED | OUTPATIENT
Start: 2019-03-25 | End: 2020-10-27 | Stop reason: SDUPTHER

## 2019-03-24 RX ORDER — INSULIN ASPART 100 [IU]/ML
5 INJECTION, SOLUTION INTRAVENOUS; SUBCUTANEOUS
Qty: 13.5 ML | Refills: 0 | Status: SHIPPED | OUTPATIENT
Start: 2019-03-24 | End: 2020-10-27 | Stop reason: SDUPTHER

## 2019-03-24 RX ORDER — INSULIN PUMP SYRINGE, 3 ML
EACH MISCELLANEOUS
Qty: 1 EACH | Refills: 0 | Status: SHIPPED | OUTPATIENT
Start: 2019-03-24 | End: 2020-03-23

## 2019-03-24 RX ADMIN — METFORMIN HYDROCHLORIDE 500 MG: 500 TABLET ORAL at 08:03

## 2019-03-24 RX ADMIN — INSULIN ASPART 5 UNITS: 100 INJECTION, SOLUTION INTRAVENOUS; SUBCUTANEOUS at 08:03

## 2019-03-24 RX ADMIN — INSULIN ASPART 2 UNITS: 100 INJECTION, SOLUTION INTRAVENOUS; SUBCUTANEOUS at 12:03

## 2019-03-24 RX ADMIN — INSULIN ASPART 5 UNITS: 100 INJECTION, SOLUTION INTRAVENOUS; SUBCUTANEOUS at 12:03

## 2019-03-24 NOTE — PLAN OF CARE
Problem: Adult Inpatient Plan of Care  Goal: Plan of Care Review  Outcome: Ongoing (interventions implemented as appropriate)     03/24/19 6250   Plan of Care Review   Plan of Care Reviewed With patient   Progress improving   Patient oriented x4 and ambulatory in room.  BCG monitoring and insulin administration done by patient as new DM education.  Tolerating ADA diet; educating on insulin types and diet control.  Spouse at bedside.  Voiding w/o difficulty. No notable changes. Will continue to monitor.

## 2019-03-24 NOTE — PLAN OF CARE
03/24/19 1204   Final Note   Assessment Type Final Discharge Note   Anticipated Discharge Disposition Home   What phone number can be called within the next 1-3 days to see how you are doing after discharge? [phone]   Hospital Follow Up  Appt(s) scheduled? Yes   Discharge plans and expectations educations in teach back method with documentation complete? Yes   Right Care Referral Info   Post Acute Recommendation No Care     ANASTACIO contacted Nurse Larsen at 11:55AM to report that pt can be discharged from a case management standpoint.

## 2019-03-24 NOTE — NURSING
Patient was able to self administer insulin at this time. Reviewed discharge instructions with patient. Denies pain. EMS reviewed. Reviewed when to seek additional medical help.No sign of hyper/hypoglycemia reaction noted, Transferred to garage via wheelchair to home. No acute distress noted

## 2019-03-24 NOTE — DISCHARGE SUMMARY
[facility] Hospital Medicine  Discharge Summary      Patient Name: Sarah Mohamud  MRN: [id number]  Admission Date: 3/20/2019  Hospital Length of Stay: 4 days  Discharge Date and Time:  03/24/2019 1:28 PM  Attending Physician: Taty Skaggs MD   Discharging Provider: Taty Higuera MD  Primary Care Provider: ADRIANO Beaulieu      HPI:   55 y/o female presents complaining of generalized weakness.  She has recently been feeling bad for the last month.  Patient has been complaining of polydipsia, polyuria and generalized fatigue.  She was evaluated at an Urgent Care 2 days ago and diagnosed with DM.  No previous hx of DM.  Patient was started on Metformin.  Her symptoms have persisted over past 2 days.  Denies any fever or chills.  She had not seen a doctor in many years until being evaluated at Urgent Care.  She presented to ER where she was again noted to be severely hyperglycemic.  Labs also consistent with DKA.  No other complaints.    * No surgery found *      Hospital Course:   55 y/o female with recent diagnosis of DM presented with DKA.  Admitted to ICU on insulin drip.  Also noted to be hypernatremic and dehydrated with ARF.  Started on aggressive IVF hydration.  HgA1c>14.  DKA resolved, but patient was severely hypernatremic with Na of 171.  This was probably iatrogenic secondary to IVF's and secondary to osmotic shift from correcting hyperglycemia.  Patient was placed on D5 to correct hypernatremia.  She was kept on insulin drip while on D5.  Sodium improving.  Stopped D5 and insulin drip.  Placed on SQ insulin.  Dose adjusted to 20 U of long acting and 5U of short acting. Resumed on metformin 500 mg, which is dose recently started, per patient's recollection. Is to be up titrated as outpatient depending on side effects (usually GI), which she has none. Prescribed equipment for accuchecks. Nursing taught patient how to do own insulin injections and accuchecks. Close f/u with PCP.  Diabetic diet. Activity as tolerated.      Consults:   Consults (From admission, onward)        Status Ordering Provider     Inpatient consult to Registered Dietitian/Nutritionist  Once     Provider:  (Not yet assigned)    Completed MARIELA LAURA     Inpatient consult to Registered Dietitian/Nutritionist  Once     Provider:  (Not yet assigned)    Completed MARIELA LAURA     Inpatient consult to Social Work  Once     Provider:  (Not yet assigned)    Acknowledged MARIELA LAURA            Final Active Diagnoses:    Diagnosis Date Noted POA    PRINCIPAL PROBLEM:  Diabetic ketoacidosis [E13.10] 03/20/2019 Yes    Hyperkalemia [E87.5] 03/20/2019 Yes    Hypernatremia [E87.0] 03/20/2019 Yes    ARF (acute renal failure) [N17.9] 03/20/2019 Yes    Diabetes mellitus, type 2 [E11.9] 03/20/2019 Yes     Chronic    Metabolic acidosis [E87.2] 03/20/2019 Yes      Problems Resolved During this Admission:       Discharged Condition: good    Disposition: Home or Self Care    Follow Up:  Follow-up Information     ADRIANO Beaulieu.    Specialty:  Family Medicine  Why:  Please contact office on Monday 3/25/19 to schedule a hospital f/u appointment.  Contact information:  [address] [facility]  [address]  [facility] [address] [phone]                   Medications:  Reconciled Home Medications:      Medication List      START taking these medications    blood sugar diagnostic Strp  1 each by Misc.(Non-Drug; Combo Route) route 4 (four) times daily before meals and nightly.     blood-glucose meter kit  Use as instructed     insulin aspart U-100 100 unit/mL Inpn pen  Commonly known as:  NovoLOG  Inject 5 Units into the skin 3 (three) times daily with meals.     insulin glargine 100 units/mL (3mL) SubQ pen  Inject 20 Units into the skin every evening.     lancets 31 gauge Misc  1 lancet by Misc.(Non-Drug; Combo Route) route 4 (four) times daily before meals and nightly.     metFORMIN 500 MG tablet  Commonly known as:   GLUCOPHAGE  Take 1 tablet (500 mg total) by mouth daily with breakfast.  Start taking on:  3/25/2019        CONTINUE taking these medications    ondansetron 8 MG tablet  Commonly known as:  ZOFRAN  Take 8 mg by mouth every 8 (eight) hours.       Indwelling Lines/Drains at time of discharge:   Lines/Drains/Airways          None          Time spent on the discharge of patient: > 45 minutes  Patient was seen and examined on the date of discharge and determined to be suitable for discharge.         Taty Higuera MD  Department of Hospital Medicine  [facility]

## 2019-03-24 NOTE — PLAN OF CARE
Problem: Adult Inpatient Plan of Care  Goal: Plan of Care Review  Outcome: Ongoing (interventions implemented as appropriate)     03/24/19 2566   Plan of Care Review   Plan of Care Reviewed With patient   A+OX4. Pt free from falls, injury, and trauma. VSSAF. Denies any pain. Pt was educated on how to use insulin pens and was able to do a  return demonstration. BS controled with long acting and SS insulin. Pt resting comfortably. Will continue to monitor.

## 2019-03-24 NOTE — PROGRESS NOTES
[facility]    WRITTEN HEALTHCARE AND DISCHARGE INFORMATION    Follow-up Information     ADRIANO Beaulieu.    Specialty:  Family Medicine  Why:  Please contact office on Monday 3/25/19 to schedule a hospital f/u appointment.  Contact information:  [address]  [address]  [facility] [address]  [phone]                   Help at Home           [phone]  After discharge for assistance [facility] On Call Nurse Care Line 24/7  Assistance     Things You are responsible For To Manage Your Care At Home:  1.    Getting your prescriptions filled   2.    Taking your medications as directed, DO NOT MISS ANY DOSES!  3.    Going to your follow-up doctor appointment. This is important because it  allow the doctor to monitor your progress and determine if  any changes need to made to your treatment plan.     Thank you for choosing [facility] for your care.  Please answer any calls you may receive from [facility] we want to continue to support you as you manage your healthcare needs. [facility] is happy to have the opportunity to serve you.      Sincerely,  Your [facility] Team,  NICOLAS Jo, ACM-RN; [facility]  [phone]  Lani SCHAEFER,LMSW

## 2019-03-24 NOTE — PROGRESS NOTES
[facility]    WRITTEN HEALTHCARE AND DISCHARGE INFORMATION  Follow-up Information     ADRIANO Beaulieu.    Specialty:  Family Medicine  Why:  Please contact office on Monday 3/25/19 to schedule a hospital f/u appointment.  Contact information:  [address]  [address]  [facility] [address]  [phone]                   Help at Home           [phone]  After discharge for assistance [facility] On Call Nurse Care Line 24/7  Assistance     Things You are responsible For To Manage Your Care At Home:  1.    Getting your prescriptions filled   2.    Taking your medications as directed, DO NOT MISS ANY DOSES!  3.    Going to your follow-up doctor appointment. This is important because it  allow the doctor to monitor your progress and determine if  any changes need to made to your treatment plan.     Thank you for choosing [facility] for your care.  Please answer any calls you may receive from [facility] we want to continue to support you as you manage your healthcare needs. [facility] is happy to have the opportunity to serve you.      Sincerely,  Your [facility] Team,  NICOLAS Jo, ACM-RN; [facility]  [phone]

## 2020-10-27 ENCOUNTER — HOSPITAL ENCOUNTER (EMERGENCY)
Facility: HOSPITAL | Age: 55
Discharge: HOME | End: 2020-10-27
Attending: EMERGENCY MEDICINE
Payer: MEDICAID

## 2020-10-27 VITALS
OXYGEN SATURATION: 98 % | WEIGHT: 160 LBS | TEMPERATURE: 98 F | HEART RATE: 61 BPM | HEIGHT: 63 IN | BODY MASS INDEX: 28.35 KG/M2 | RESPIRATION RATE: 18 BRPM | DIASTOLIC BLOOD PRESSURE: 90 MMHG | SYSTOLIC BLOOD PRESSURE: 128 MMHG

## 2020-10-27 DIAGNOSIS — Z76.0 MEDICATION REFILL: ICD-10-CM

## 2020-10-27 DIAGNOSIS — D72.819 LEUKOPENIA, UNSPECIFIED TYPE: ICD-10-CM

## 2020-10-27 DIAGNOSIS — R73.9 HYPERGLYCEMIA: Primary | ICD-10-CM

## 2020-10-27 LAB
ALBUMIN SERPL BCP-MCNC: 4.2 G/DL (ref 3.5–5.2)
ALLENS TEST: ABNORMAL
ALP SERPL-CCNC: 127 U/L (ref 55–135)
ALT SERPL W/O P-5'-P-CCNC: 21 U/L (ref 10–44)
ANION GAP SERPL CALC-SCNC: 12 MMOL/L (ref 8–16)
AST SERPL-CCNC: 17 U/L (ref 10–40)
B-HCG UR QL: NEGATIVE
B-OH-BUTYR BLD STRIP-SCNC: 0.2 MMOL/L (ref 0–0.5)
BACTERIA #/AREA URNS HPF: NORMAL /HPF
BASOPHILS # BLD AUTO: 0.03 K/UL (ref 0–0.2)
BASOPHILS NFR BLD: 1 % (ref 0–1.9)
BILIRUB SERPL-MCNC: 0.8 MG/DL (ref 0.1–1)
BILIRUB UR QL STRIP: NEGATIVE
BUN SERPL-MCNC: 14 MG/DL (ref 6–20)
CALCIUM SERPL-MCNC: 9.2 MG/DL (ref 8.7–10.5)
CHLORIDE SERPL-SCNC: 99 MMOL/L (ref 95–110)
CLARITY UR: CLEAR
CO2 SERPL-SCNC: 24 MMOL/L (ref 23–29)
COLOR UR: ABNORMAL
CREAT SERPL-MCNC: 1.2 MG/DL (ref 0.5–1.4)
CTP QC/QA: YES
DIFFERENTIAL METHOD: ABNORMAL
EOSINOPHIL # BLD AUTO: 0.1 K/UL (ref 0–0.5)
EOSINOPHIL NFR BLD: 1.7 % (ref 0–8)
ERYTHROCYTE [DISTWIDTH] IN BLOOD BY AUTOMATED COUNT: 12.5 % (ref 11.5–14.5)
EST. GFR  (AFRICAN AMERICAN): 60 ML/MIN/1.73 M^2
EST. GFR  (NON AFRICAN AMERICAN): 52 ML/MIN/1.73 M^2
GLUCOSE SERPL-MCNC: 550 MG/DL (ref 70–110)
GLUCOSE UR QL STRIP: ABNORMAL
HCO3 UR-SCNC: 30.8 MMOL/L (ref 24–28)
HCT VFR BLD AUTO: 40 % (ref 37–48.5)
HGB BLD-MCNC: 13.6 G/DL (ref 12–16)
HGB UR QL STRIP: NEGATIVE
IMM GRANULOCYTES # BLD AUTO: 0.01 K/UL (ref 0–0.04)
IMM GRANULOCYTES NFR BLD AUTO: 0.3 % (ref 0–0.5)
KETONES UR QL STRIP: NEGATIVE
LEUKOCYTE ESTERASE UR QL STRIP: NEGATIVE
LYMPHOCYTES # BLD AUTO: 1.1 K/UL (ref 1–4.8)
LYMPHOCYTES NFR BLD: 36.6 % (ref 18–48)
MAGNESIUM SERPL-MCNC: 1.8 MG/DL (ref 1.6–2.6)
MCH RBC QN AUTO: 27.7 PG (ref 27–31)
MCHC RBC AUTO-ENTMCNC: 34 G/DL (ref 32–36)
MCV RBC AUTO: 82 FL (ref 82–98)
MICROSCOPIC COMMENT: NORMAL
MONOCYTES # BLD AUTO: 0.2 K/UL (ref 0.3–1)
MONOCYTES NFR BLD: 8.3 % (ref 4–15)
NEUTROPHILS # BLD AUTO: 1.5 K/UL (ref 1.8–7.7)
NEUTROPHILS NFR BLD: 52.1 % (ref 38–73)
NITRITE UR QL STRIP: NEGATIVE
NRBC BLD-RTO: 0 /100 WBC
PCO2 BLDA: 53.1 MMHG (ref 35–45)
PH SMN: 7.37 [PH] (ref 7.35–7.45)
PH UR STRIP: 6 [PH] (ref 5–8)
PLATELET # BLD AUTO: 179 K/UL (ref 150–350)
PMV BLD AUTO: 12.1 FL (ref 9.2–12.9)
PO2 BLDA: 15 MMHG (ref 40–60)
POC BE: 4 MMOL/L
POC SATURATED O2: 17 % (ref 95–100)
POC TCO2: 32 MMOL/L (ref 24–29)
POCT GLUCOSE: 263 MG/DL (ref 70–110)
POCT GLUCOSE: >500 MG/DL (ref 70–110)
POTASSIUM SERPL-SCNC: 4.2 MMOL/L (ref 3.5–5.1)
PROT SERPL-MCNC: 8.1 G/DL (ref 6–8.4)
PROT UR QL STRIP: NEGATIVE
RBC # BLD AUTO: 4.91 M/UL (ref 4–5.4)
SAMPLE: ABNORMAL
SITE: ABNORMAL
SODIUM SERPL-SCNC: 135 MMOL/L (ref 136–145)
SP GR UR STRIP: 1.02 (ref 1–1.03)
URN SPEC COLLECT METH UR: ABNORMAL
UROBILINOGEN UR STRIP-ACNC: NEGATIVE EU/DL
WBC # BLD AUTO: 2.9 K/UL (ref 3.9–12.7)
WBC #/AREA URNS HPF: 1 /HPF (ref 0–5)
YEAST URNS QL MICRO: NORMAL

## 2020-10-27 PROCEDURE — 99900035 HC TECH TIME PER 15 MIN (STAT)

## 2020-10-27 PROCEDURE — 81000 URINALYSIS NONAUTO W/SCOPE: CPT

## 2020-10-27 PROCEDURE — 96374 THER/PROPH/DIAG INJ IV PUSH: CPT

## 2020-10-27 PROCEDURE — 93010 EKG 12-LEAD: ICD-10-PCS | Mod: ,,, | Performed by: INTERNAL MEDICINE

## 2020-10-27 PROCEDURE — 63600175 PHARM REV CODE 636 W HCPCS: Performed by: PHYSICIAN ASSISTANT

## 2020-10-27 PROCEDURE — 81025 URINE PREGNANCY TEST: CPT | Performed by: EMERGENCY MEDICINE

## 2020-10-27 PROCEDURE — 83735 ASSAY OF MAGNESIUM: CPT

## 2020-10-27 PROCEDURE — 80053 COMPREHEN METABOLIC PANEL: CPT

## 2020-10-27 PROCEDURE — 82962 GLUCOSE BLOOD TEST: CPT

## 2020-10-27 PROCEDURE — 96361 HYDRATE IV INFUSION ADD-ON: CPT

## 2020-10-27 PROCEDURE — 93005 ELECTROCARDIOGRAM TRACING: CPT

## 2020-10-27 PROCEDURE — 99284 EMERGENCY DEPT VISIT MOD MDM: CPT | Mod: 25

## 2020-10-27 PROCEDURE — 93010 ELECTROCARDIOGRAM REPORT: CPT | Mod: ,,, | Performed by: INTERNAL MEDICINE

## 2020-10-27 PROCEDURE — 82010 KETONE BODYS QUAN: CPT

## 2020-10-27 PROCEDURE — 85025 COMPLETE CBC W/AUTO DIFF WBC: CPT

## 2020-10-27 PROCEDURE — 82803 BLOOD GASES ANY COMBINATION: CPT

## 2020-10-27 RX ORDER — INSULIN ASPART 100 [IU]/ML
5 INJECTION, SOLUTION INTRAVENOUS; SUBCUTANEOUS
Qty: 13.5 ML | Refills: 0 | Status: SHIPPED | OUTPATIENT
Start: 2020-10-27 | End: 2021-01-25

## 2020-10-27 RX ORDER — INSULIN GLARGINE 100 [IU]/ML
20 INJECTION, SOLUTION SUBCUTANEOUS NIGHTLY
Qty: 18 ML | Refills: 3 | Status: SHIPPED | OUTPATIENT
Start: 2020-10-27 | End: 2021-10-27

## 2020-10-27 RX ORDER — METFORMIN HYDROCHLORIDE 500 MG/1
500 TABLET ORAL
Qty: 90 TABLET | Refills: 3 | Status: SHIPPED | OUTPATIENT
Start: 2020-10-27 | End: 2021-10-27

## 2020-10-27 RX ADMIN — INSULIN HUMAN 10 UNITS: 100 INJECTION, SOLUTION PARENTERAL at 02:10

## 2020-10-27 NOTE — ED PROVIDER NOTES
Encounter Date: 10/27/2020    SCRIBE #1 NOTE: I, Cary Bliss, am scribing for, and in the presence of,  LEI Brown. I have scribed the following portions of the note - Other sections scribed: GONZALO CARRASCO.       History     Chief Complaint   Patient presents with    Hyperglycemia     hx of DM blood glucose at home 384 out of medication x months     This is a 55 y.o. female with a PMHx of type 2 DM and GERD who presents to the ED due to having elevated blood sugar at home today. She reports that she ran out of her Metformin months ago and she couldn't refill her prescription due to not having any insurance at the time. She states that she has been taking her mother's Metformin and using Insulin when she really needs it. She notes that her last dose was last week. She reports that she feels jittery and weak when her blood sugar is high or low. She notes that she currently has blurry vision, polydipsia, and urinary frequency. She denies a PMHx of heart or kidney problems. Denies nausea and vomiting. No other associated symptoms.     The history is provided by the patient. No  was used.     Review of patient's allergies indicates:  No Known Allergies  Past Medical History:   Diagnosis Date    Acid reflux      Past Surgical History:   Procedure Laterality Date    SHOULDER OPEN ROTATOR CUFF REPAIR Right 2018     Family History   Problem Relation Age of Onset    Diabetes Mother     Depression Maternal Grandmother      Social History     Tobacco Use    Smoking status: Never Smoker    Smokeless tobacco: Never Used   Substance Use Topics    Alcohol use: No    Drug use: No     Review of Systems   Constitutional: Negative for fever.        (+) Hyperglycemia   HENT: Negative for sore throat.    Eyes: Positive for visual disturbance.   Respiratory: Negative for shortness of breath.    Cardiovascular: Negative for chest pain.   Gastrointestinal: Negative for nausea and vomiting.   Endocrine:  Positive for polydipsia.   Genitourinary: Positive for frequency. Negative for dysuria.   Musculoskeletal: Negative for back pain.   Skin: Negative for rash.   Neurological: Negative for weakness.   Hematological: Does not bruise/bleed easily.       Physical Exam     Initial Vitals   BP Pulse Resp Temp SpO2   10/27/20 1146 10/27/20 1146 10/27/20 1146 10/27/20 1416 10/27/20 1146   (!) 160/89 80 18 99.2 °F (37.3 °C) 99 %      MAP       --                Physical Exam    Nursing note and vitals reviewed.  Constitutional: She appears well-developed and well-nourished. She is not diaphoretic. No distress.   HENT:   Head: Normocephalic and atraumatic.   Nose: Nose normal.   Mouth/Throat: Oropharynx is clear and moist.   Eyes: Conjunctivae and EOM are normal. Right eye exhibits no discharge. Left eye exhibits no discharge.   Neck: Normal range of motion. No tracheal deviation present. No JVD present.   Cardiovascular: Normal rate, regular rhythm and normal heart sounds. Exam reveals no friction rub.    No murmur heard.  Pulmonary/Chest: Breath sounds normal. No stridor. No respiratory distress. She has no wheezes. She has no rhonchi. She has no rales. She exhibits no tenderness.   Abdominal: Soft. She exhibits no distension. There is no abdominal tenderness. There is no guarding.   Musculoskeletal: Normal range of motion.   Neurological: She is alert and oriented to person, place, and time.   Skin: Skin is warm and dry. No rash and no abscess noted. No erythema. No pallor.         ED Course   Procedures  Labs Reviewed   CBC W/ AUTO DIFFERENTIAL - Abnormal; Notable for the following components:       Result Value    WBC 2.90 (*)     Gran # (ANC) 1.5 (*)     Mono # 0.2 (*)     All other components within normal limits   COMPREHENSIVE METABOLIC PANEL - Abnormal; Notable for the following components:    Sodium 135 (*)     Glucose 550 (*)     eGFR if non  52 (*)     All other components within normal limits     Narrative:     GLU critical result(s) called and verbal readback obtained from   JUAN JOSÉ VALENZUELA by AR1 10/27/2020 13:53   URINALYSIS, REFLEX TO URINE CULTURE - Abnormal; Notable for the following components:    Glucose, UA 3+ (*)     All other components within normal limits    Narrative:     Specimen Source->Urine   POCT GLUCOSE - Abnormal; Notable for the following components:    POCT Glucose >500 (*)     All other components within normal limits   ISTAT PROCEDURE - Abnormal; Notable for the following components:    POC PCO2 53.1 (*)     POC PO2 15 (*)     POC HCO3 30.8 (*)     POC SATURATED O2 17 (*)     POC TCO2 32 (*)     All other components within normal limits   POCT GLUCOSE - Abnormal; Notable for the following components:    POCT Glucose 263 (*)     All other components within normal limits   BETA - HYDROXYBUTYRATE, SERUM   MAGNESIUM   URINALYSIS MICROSCOPIC    Narrative:     Specimen Source->Urine   POCT URINE PREGNANCY        ECG Results          EKG 12-lead (In process)  Result time 10/27/20 14:28:43    In process by Interface, Lab In [facility] (10/27/20 14:28:43)                 Narrative:    Test Reason : R73.9,    Vent. Rate : 082 BPM     Atrial Rate : 082 BPM     P-R Int : 136 ms          QRS Dur : 076 ms      QT Int : 312 ms       P-R-T Axes : 065 011 044 degrees     QTc Int : 364 ms    Normal sinus rhythm  Possible Left atrial enlargement  Low voltage QRS  Cannot rule out Anterior infarct ,age undetermined  Abnormal ECG  When compared with ECG of 20-MAR-2019 16:35,  Significant changes have occurred    Referred By: [email or web address]   SELF           Confirmed By:                   In process by Interface, Lab In [facility] (10/27/20 14:24:28)                 Narrative:    Test Reason : R73.9,    Vent. Rate : 082 BPM     Atrial Rate : 082 BPM     P-R Int : 136 ms          QRS Dur : 076 ms      QT Int : 312 ms       P-R-T Axes : 065 011 044 degrees     QTc Int : 364 ms    Normal sinus rhythm  Possible Left atrial  enlargement  Low voltage QRS  Cannot rule out Anterior infarct ,age undetermined  Abnormal ECG  When compared with ECG of 27-OCT-2020 12:04,  No significant change was found    Referred By: [email or web address]   SELF           Confirmed By:                             Imaging Results    None          Medical Decision Making:   History:   Old Medical Records: I decided to obtain old medical records.  Independently Interpreted Test(s):   I have ordered and independently interpreted EKG Reading(s) - see prior notes  Clinical Tests:   Lab Tests: Ordered and Reviewed  Medical Tests: Ordered and Reviewed  ED Management:  Patient ran of diabetic medications.  Today she is hyperglycemic without evidence of DKA.  Tolerating p.o..  Glucose improves in the ED.  Symptoms improved and she remains well appearing. No evidence of concurrent infectious bacterial process.  Will refill her medications and advise close follow-up with PCP.  Strict return precautions discussed with patient who is agreeable to the plan.            Scribe Attestation:   Scribe #1: I performed the above scribed service and the documentation accurately describes the services I performed. I attest to the accuracy of the note.                      Clinical Impression:       ICD-10-CM ICD-9-CM   1. Hyperglycemia  R73.9 790.29   2. Leukopenia, unspecified type  D72.819 288.50   3. Medication refill  Z76.0 V68.1                          ED Disposition Condition    Discharge Stable        ED Prescriptions     Medication Sig Dispense Start Date End Date Auth. Provider    insulin aspart U-100 (NOVOLOG) 100 unit/mL (3 mL) InPn pen Inject 5 Units into the skin 3 (three) times daily with meals. 13.5 mL 10/27/2020 1/25/2021 Alexander Morales PA-C    insulin glargine 100 units/mL (3mL) SubQ pen Inject 20 Units into the skin every evening. 18 mL 10/27/2020 10/27/2021 Alexander Morales PA-C    metFORMIN (GLUCOPHAGE) 500 MG tablet Take 1 tablet (500 mg total) by mouth daily with  breakfast. 90 tablet 10/27/2020 10/27/2021 Alexander Morales PA-C    lancets 31 gauge Misc 1 lancet by Misc.(Non-Drug; Combo Route) route 4 (four) times daily before meals and nightly. 100 each 10/27/2020  Alexander Morales PA-C    blood sugar diagnostic Strp 1 each by Misc.(Non-Drug; Combo Route) route 4 (four) times daily before meals and nightly. 200 each 10/27/2020  Alexander Morales PA-C        Follow-up Information     Follow up With Specialties Details Why Contact Info    [facility]  Schedule an appointment as soon as possible for a visit in 1 day For reevaluation, AND to establish primary if you don't have a  [address] [place] [address]  [phone]      [facility] Emergency Medicine Go to  If symptoms worsen [address] [facility] [address] [phone]                      Scribe Attestation: I, Alexander Morales PA-C, personally performed the services described in this documentation. All medical record entries made by the scribe were at my direction and in my presence.  I have reviewed the chart and agree that the record reflects my personal performance and is accurate and complete.                 Alexander Morales PA-C  10/27/20 1519

## 2020-10-27 NOTE — FIRST PROVIDER EVALUATION
" Emergency Department TeleTriage Encounter Note      CHIEF COMPLAINT    Chief Complaint   Patient presents with    Hyperglycemia     hx of DM blood glucose at home 384 out of medication x months       VITAL SIGNS   Initial Vitals [10/27/20 1146]   BP Pulse Resp Temp SpO2   (!) 160/89 80 18 -- 99 %      MAP       --            ALLERGIES    Review of patient's allergies indicates:  No Known Allergies    PROVIDER TRIAGE NOTE  Patient is a 55 y.o. female with DM2, presenting with hyperglycemia for the last few days. She states she has been out of her metformin for the last few weeks. She states she has been checking her sugar at home and it just reads "high". She has been using insulin. Blood glucose in triage is over 500. Labs, fluids, EKG ordered.      ORDERS  Labs Reviewed   POCT GLUCOSE - Abnormal; Notable for the following components:       Result Value    POCT Glucose >500 (*)     All other components within normal limits   CBC W/ AUTO DIFFERENTIAL   COMPREHENSIVE METABOLIC PANEL   BETA - HYDROXYBUTYRATE, SERUM   MAGNESIUM   URINALYSIS, REFLEX TO URINE CULTURE       ED Orders (720h ago, onward)    Start Ordered     Status Ordering Provider    10/27/20 1200 10/27/20 1152  sodium chloride 0.9% bolus 1,000 mL  ED 1 Time      Ordered SINAN GARCIAOLE    10/27/20 1153 10/27/20 1152  POCT Venous Blood Gas Once  Once     Comments: This test should be used for VBGs.  If using this order for other tests (K, creatinine, HCT, PT/INR, lactate etc)  ONLY do so in the case of an emergency or rapid response.  Notify Physician if: see parameters below.    Ordered SINAN GARCIAOLE    10/27/20 1153 10/27/20 1152  Saline lock IV  Once      Ordered LYNNEEHSINANELVIN    10/27/20 1153 10/27/20 1152  EKG 12-lead  Once      Ordered LYNNEEH ELVIN    10/27/20 1153 10/27/20 1152  Magnesium  STAT  Collect    Ordered LYNNEEHSINANELVIN    10/27/20 1153 10/27/20 1152  Urinalysis, Reflex to Urine Culture Urine, Clean Catch  STAT      Ordered JOSE, " ELVIN    10/27/20 1152 10/27/20 1152  CBC auto differential  STAT  Collect    Ordered JOSE, ELVIN    10/27/20 1152 10/27/20 1152  Comprehensive metabolic panel  STAT  Collect    Ordered JOSE, ELVIN    10/27/20 1152 10/27/20 1152  Beta - Hydroxybutyrate, Serum  Once  Collect    Ordered ELVIN GARCIA    10/27/20 1147 10/27/20 1147  POCT glucose  Once  Completed    Final result EMERGENCY, DEPT PHYSICIAN            Virtual Visit Note: The provider triage portion of this emergency department evaluation and documentation was performed via [employer], a HIPAA-compliant telemedicine application, in concert with a tele-presenter in the room. A face to face patient evaluation with one of my colleagues will occur once the patient is placed in an emergency department room.      DISCLAIMER: This note was prepared with [employer] voice recognition transcription software. Garbled syntax, mangled pronouns, and other bizarre constructions may be attributed to that software system.

## 2022-12-29 ENCOUNTER — IMMUNIZATION (OUTPATIENT)
Dept: INTERNAL MEDICINE | Facility: CLINIC | Age: 57
End: 2022-12-29
Payer: MEDICAID

## 2022-12-29 DIAGNOSIS — Z23 NEED FOR VACCINATION: Primary | ICD-10-CM

## 2022-12-29 PROCEDURE — 91312 COVID-19, MRNA, LNP-S, BIVALENT BOOSTER, PF, 30 MCG/0.3 ML DOSE: CPT | Mod: PBBFAC

## 2022-12-29 PROCEDURE — 0124A COVID-19, MRNA, LNP-S, BIVALENT BOOSTER, PF, 30 MCG/0.3 ML DOSE: CPT | Mod: PBBFAC,CV19
